# Patient Record
Sex: FEMALE | Race: WHITE | NOT HISPANIC OR LATINO | Employment: OTHER | URBAN - METROPOLITAN AREA
[De-identification: names, ages, dates, MRNs, and addresses within clinical notes are randomized per-mention and may not be internally consistent; named-entity substitution may affect disease eponyms.]

---

## 2024-01-30 RX ORDER — LANOLIN ALCOHOL/MO/W.PET/CERES
3 CREAM (GRAM) TOPICAL
COMMUNITY

## 2024-01-30 RX ORDER — ROSUVASTATIN CALCIUM 5 MG/1
5 TABLET, COATED ORAL
COMMUNITY
Start: 2024-01-10

## 2024-01-30 RX ORDER — MOMETASONE FUROATE 1 MG/G
CREAM TOPICAL DAILY PRN
COMMUNITY
Start: 2023-01-30

## 2024-01-30 RX ORDER — LYSINE HCL 500 MG
500 TABLET ORAL DAILY
COMMUNITY

## 2024-01-30 RX ORDER — KETOCONAZOLE 20 MG/G
1 CREAM TOPICAL DAILY PRN
COMMUNITY
Start: 2023-01-30

## 2024-01-30 RX ORDER — METOPROLOL TARTRATE 50 MG
1 TABLET ORAL 2 TIMES DAILY
COMMUNITY
Start: 2023-08-18

## 2024-01-30 RX ORDER — LANOLIN ALCOHOL/MO/W.PET/CERES
1000 CREAM (GRAM) TOPICAL DAILY
COMMUNITY

## 2024-01-30 RX ORDER — AMOXICILLIN 500 MG
1200 CAPSULE ORAL DAILY
COMMUNITY

## 2024-01-30 NOTE — PROVIDER NOTIFICATION
01/30/24 1459   Discharge Planning   Patient/Family Anticipates Transition to home with family   Living Arrangements   People in Home spouse   Type of Residence Private Residence   Is your private residence a single family home or apartment? Single family home   Number of Stairs, Within Home, Primary greater than 10 stairs  (stairs to get into house)   Stair Railings, Within Home, Primary railings safe and in good condition   Once home, are you able to live on one level? Yes   Which level? Main Level   Bathroom Shower/Tub Walk-in shower   Equipment Currently Used at Home none   Support System   Support Systems Spouse/Significant Other;Children   Do you have someone available to stay with you one or two nights once you are home? Yes   Medical Clearance   Date of Physical 02/05/24   Clinic Name [facility]   It is recommended that you call and check with any specialty providers before surgery to see if you need surgical clearance.  Do you see any specialty providers outside of your primary care provider? No   Blood   Known Bleeding Disorder or Coagulopathy No   Does the patient have any Worship/cultural preferences related to blood products? No   Education   Has the patient scheduled or completed pre-op total joint education, either in class or online, in the last 12 months? No   What day did the patient complete, or plan to complete, pre-op total joint education? 01/30/24  (plans to view pre recorded class)   Patient attended total joint pre-op class/received pre-op teaching  online   Relationship/Living Environment   Name(s) of People in Home [place]/spouse

## 2024-02-24 NOTE — PHARMACY-ADMISSION MEDICATION HISTORY
PTA meds completed by pre-admitting nurse Paola Valencia and reviewed by pharmacy       Prior to Admission medications    Medication Sig Last Dose Taking? Auth Provider Long Term End Date   cholecalciferol (VITAMIN D3) 125 mcg (5000 units) capsule Take 125 mcg by mouth daily  Yes Reported, Patient     cyanocobalamin (VITAMIN B-12) 1000 MCG tablet Take 1,000 mcg by mouth daily  Yes Reported, Patient     ketoconazole (NIZORAL) 2 % external cream Apply 1 Application topically daily as needed  Yes Reported, Patient     l-lysine HCl 500 MG TABS tablet Take 500 mg by mouth daily  Yes Reported, Patient     melatonin 3 MG tablet Take 3 mg by mouth nightly as needed for sleep  Yes Reported, Patient     metoprolol tartrate (LOPRESSOR) 50 MG tablet Take 1 tablet by mouth 2 times daily  Yes Reported, Patient Yes    mometasone (ELOCON) 0.1 % external cream Apply topically daily as needed  Yes Reported, Patient     Omega-3 Fatty Acids (FISH OIL) 1200 MG capsule Take 1,200 mg by mouth daily  Yes Reported, Patient     rosuvastatin (CRESTOR) 5 MG tablet Take 5 mg by mouth twice a week  Yes Reported, Patient Yes    Probiotic Product (MISC INTESTINAL DELGADO REGULAT) CAPS Take 1 capsule by mouth daily   Reported, Patient

## 2024-02-26 ENCOUNTER — APPOINTMENT (OUTPATIENT)
Dept: GENERAL RADIOLOGY | Facility: CLINIC | Age: 74
End: 2024-02-26
Attending: ORTHOPAEDIC SURGERY
Payer: COMMERCIAL

## 2024-02-26 ENCOUNTER — ANESTHESIA EVENT (OUTPATIENT)
Dept: SURGERY | Facility: CLINIC | Age: 74
End: 2024-02-26
Payer: COMMERCIAL

## 2024-02-26 ENCOUNTER — ANESTHESIA (OUTPATIENT)
Dept: SURGERY | Facility: CLINIC | Age: 74
End: 2024-02-26
Payer: COMMERCIAL

## 2024-02-26 ENCOUNTER — HOSPITAL ENCOUNTER (OUTPATIENT)
Facility: CLINIC | Age: 74
Discharge: HOME | End: 2024-02-27
Attending: ORTHOPAEDIC SURGERY | Admitting: ORTHOPAEDIC SURGERY
Payer: COMMERCIAL

## 2024-02-26 ENCOUNTER — APPOINTMENT (OUTPATIENT)
Dept: PHYSICAL THERAPY | Facility: CLINIC | Age: 74
End: 2024-02-26
Attending: ORTHOPAEDIC SURGERY
Payer: COMMERCIAL

## 2024-02-26 DIAGNOSIS — Z96.651 S/P TOTAL KNEE ARTHROPLASTY, RIGHT: Primary | ICD-10-CM

## 2024-02-26 LAB
CREAT SERPL-MCNC: 0.63 MG/DL (ref 0.51–0.95)
EGFRCR SERPLBLD CKD-EPI 2021: >90 ML/MIN/1.73M2
HOLD SPECIMEN: NORMAL
HOLD SPECIMEN: NORMAL
PLATELET # BLD AUTO: 255 10E3/UL (ref 150–450)

## 2024-02-26 PROCEDURE — 97161 PT EVAL LOW COMPLEX 20 MIN: CPT | Mod: GP

## 2024-02-26 PROCEDURE — 250N000013 HC RX MED GY IP 250 OP 250 PS 637: Performed by: PHYSICIAN ASSISTANT

## 2024-02-26 PROCEDURE — 99207 PR NO CHARGE LOS: CPT | Performed by: PHYSICIAN ASSISTANT

## 2024-02-26 PROCEDURE — 999N000065 XR KNEE PORT RIGHT 1/2 VIEWS: Mod: RT

## 2024-02-26 PROCEDURE — 258N000003 HC RX IP 258 OP 636: Performed by: NURSE ANESTHETIST, CERTIFIED REGISTERED

## 2024-02-26 PROCEDURE — 710N000009 HC RECOVERY PHASE 1, LEVEL 1, PER MIN: Performed by: ORTHOPAEDIC SURGERY

## 2024-02-26 PROCEDURE — 258N000003 HC RX IP 258 OP 636: Performed by: ANESTHESIOLOGY

## 2024-02-26 PROCEDURE — 97110 THERAPEUTIC EXERCISES: CPT | Mod: GP

## 2024-02-26 PROCEDURE — 999N000141 HC STATISTIC PRE-PROCEDURE NURSING ASSESSMENT: Performed by: ORTHOPAEDIC SURGERY

## 2024-02-26 PROCEDURE — 250N000011 HC RX IP 250 OP 636: Performed by: ANESTHESIOLOGY

## 2024-02-26 PROCEDURE — 250N000009 HC RX 250: Performed by: NURSE ANESTHETIST, CERTIFIED REGISTERED

## 2024-02-26 PROCEDURE — 82565 ASSAY OF CREATININE: CPT | Performed by: ORTHOPAEDIC SURGERY

## 2024-02-26 PROCEDURE — 250N000011 HC RX IP 250 OP 636: Performed by: NURSE ANESTHETIST, CERTIFIED REGISTERED

## 2024-02-26 PROCEDURE — 250N000011 HC RX IP 250 OP 636: Performed by: PHYSICIAN ASSISTANT

## 2024-02-26 PROCEDURE — 258N000003 HC RX IP 258 OP 636: Performed by: ORTHOPAEDIC SURGERY

## 2024-02-26 PROCEDURE — 36415 COLL VENOUS BLD VENIPUNCTURE: CPT | Performed by: ORTHOPAEDIC SURGERY

## 2024-02-26 PROCEDURE — 370N000017 HC ANESTHESIA TECHNICAL FEE, PER MIN: Performed by: ORTHOPAEDIC SURGERY

## 2024-02-26 PROCEDURE — 97116 GAIT TRAINING THERAPY: CPT | Mod: GP

## 2024-02-26 PROCEDURE — C1713 ANCHOR/SCREW BN/BN,TIS/BN: HCPCS | Performed by: ORTHOPAEDIC SURGERY

## 2024-02-26 PROCEDURE — 250N000009 HC RX 250: Performed by: ORTHOPAEDIC SURGERY

## 2024-02-26 PROCEDURE — 250N000013 HC RX MED GY IP 250 OP 250 PS 637: Performed by: ORTHOPAEDIC SURGERY

## 2024-02-26 PROCEDURE — 250N000011 HC RX IP 250 OP 636: Performed by: ORTHOPAEDIC SURGERY

## 2024-02-26 PROCEDURE — 258N000001 HC RX 258: Performed by: ORTHOPAEDIC SURGERY

## 2024-02-26 PROCEDURE — 360N000077 HC SURGERY LEVEL 4, PER MIN: Performed by: ORTHOPAEDIC SURGERY

## 2024-02-26 PROCEDURE — 272N000001 HC OR GENERAL SUPPLY STERILE: Performed by: ORTHOPAEDIC SURGERY

## 2024-02-26 PROCEDURE — C1776 JOINT DEVICE (IMPLANTABLE): HCPCS | Performed by: ORTHOPAEDIC SURGERY

## 2024-02-26 PROCEDURE — 85049 AUTOMATED PLATELET COUNT: CPT | Performed by: ORTHOPAEDIC SURGERY

## 2024-02-26 PROCEDURE — 250N000013 HC RX MED GY IP 250 OP 250 PS 637: Performed by: ANESTHESIOLOGY

## 2024-02-26 DEVICE — IBAL TKA FEM, PS CEMENTED, SZ 5, RIGHT
Type: IMPLANTABLE DEVICE | Site: KNEE | Status: FUNCTIONAL
Brand: ARTHREX®

## 2024-02-26 DEVICE — PATELLA IMPLANT DOME, 37 X 10MM, VIT-E
Type: IMPLANTABLE DEVICE | Site: KNEE | Status: FUNCTIONAL
Brand: ARTHREX®

## 2024-02-26 DEVICE — IBALANCE TKA, MODULAR TIBIAL TRAY, SZ 4
Type: IMPLANTABLE DEVICE | Site: KNEE | Status: FUNCTIONAL
Brand: ARTHREX®

## 2024-02-26 DEVICE — BONE CEMENT STRK SIMPLEX P SPEEDSET 6192-1-001: Type: IMPLANTABLE DEVICE | Site: KNEE | Status: FUNCTIONAL

## 2024-02-26 DEVICE — PS TIBIAL BEARING, SIZE 4, 8MM, VIT E
Type: IMPLANTABLE DEVICE | Site: KNEE | Status: FUNCTIONAL
Brand: ARTHREX®

## 2024-02-26 RX ORDER — CELECOXIB 200 MG/1
400 CAPSULE ORAL ONCE
Status: COMPLETED | OUTPATIENT
Start: 2024-02-26 | End: 2024-02-26

## 2024-02-26 RX ORDER — FENTANYL CITRATE 50 UG/ML
INJECTION, SOLUTION INTRAMUSCULAR; INTRAVENOUS PRN
Status: DISCONTINUED | OUTPATIENT
Start: 2024-02-26 | End: 2024-02-26

## 2024-02-26 RX ORDER — ROSUVASTATIN CALCIUM 5 MG/1
5 TABLET, COATED ORAL
Status: DISCONTINUED | OUTPATIENT
Start: 2024-02-26 | End: 2024-02-27 | Stop reason: HOSPADM

## 2024-02-26 RX ORDER — BISACODYL 10 MG
10 SUPPOSITORY, RECTAL RECTAL DAILY PRN
Status: DISCONTINUED | OUTPATIENT
Start: 2024-02-26 | End: 2024-02-27 | Stop reason: HOSPADM

## 2024-02-26 RX ORDER — DEXAMETHASONE SODIUM PHOSPHATE 4 MG/ML
4 INJECTION, SOLUTION INTRA-ARTICULAR; INTRALESIONAL; INTRAMUSCULAR; INTRAVENOUS; SOFT TISSUE
Status: DISCONTINUED | OUTPATIENT
Start: 2024-02-26 | End: 2024-02-26 | Stop reason: HOSPADM

## 2024-02-26 RX ORDER — PROCHLORPERAZINE MALEATE 5 MG
5 TABLET ORAL EVERY 6 HOURS PRN
Status: DISCONTINUED | OUTPATIENT
Start: 2024-02-26 | End: 2024-02-27 | Stop reason: HOSPADM

## 2024-02-26 RX ORDER — ONDANSETRON 2 MG/ML
4 INJECTION INTRAMUSCULAR; INTRAVENOUS EVERY 30 MIN PRN
Status: DISCONTINUED | OUTPATIENT
Start: 2024-02-26 | End: 2024-02-26 | Stop reason: HOSPADM

## 2024-02-26 RX ORDER — HYDROMORPHONE HCL IN WATER/PF 6 MG/30 ML
0.2 PATIENT CONTROLLED ANALGESIA SYRINGE INTRAVENOUS
Status: DISCONTINUED | OUTPATIENT
Start: 2024-02-26 | End: 2024-02-27 | Stop reason: HOSPADM

## 2024-02-26 RX ORDER — OXYCODONE HYDROCHLORIDE 5 MG/1
5 TABLET ORAL EVERY 4 HOURS PRN
Status: DISCONTINUED | OUTPATIENT
Start: 2024-02-26 | End: 2024-02-26 | Stop reason: HOSPADM

## 2024-02-26 RX ORDER — ONDANSETRON 4 MG/1
4 TABLET, ORALLY DISINTEGRATING ORAL EVERY 30 MIN PRN
Status: DISCONTINUED | OUTPATIENT
Start: 2024-02-26 | End: 2024-02-26 | Stop reason: HOSPADM

## 2024-02-26 RX ORDER — ACETAMINOPHEN 325 MG/1
975 TABLET ORAL EVERY 8 HOURS
Qty: 27 TABLET | Refills: 0 | Status: DISCONTINUED | OUTPATIENT
Start: 2024-02-26 | End: 2024-02-27 | Stop reason: HOSPADM

## 2024-02-26 RX ORDER — OXYCODONE HYDROCHLORIDE 5 MG/1
10 TABLET ORAL EVERY 4 HOURS PRN
Status: DISCONTINUED | OUTPATIENT
Start: 2024-02-26 | End: 2024-02-27 | Stop reason: HOSPADM

## 2024-02-26 RX ORDER — LIDOCAINE HYDROCHLORIDE 20 MG/ML
INJECTION, SOLUTION INFILTRATION; PERINEURAL PRN
Status: DISCONTINUED | OUTPATIENT
Start: 2024-02-26 | End: 2024-02-26

## 2024-02-26 RX ORDER — FENTANYL CITRATE 50 UG/ML
50 INJECTION, SOLUTION INTRAMUSCULAR; INTRAVENOUS EVERY 5 MIN PRN
Status: DISCONTINUED | OUTPATIENT
Start: 2024-02-26 | End: 2024-02-26 | Stop reason: HOSPADM

## 2024-02-26 RX ORDER — OXYCODONE HYDROCHLORIDE 5 MG/1
10 TABLET ORAL EVERY 4 HOURS PRN
Status: DISCONTINUED | OUTPATIENT
Start: 2024-02-26 | End: 2024-02-26 | Stop reason: HOSPADM

## 2024-02-26 RX ORDER — CEFAZOLIN SODIUM/WATER 2 G/20 ML
2 SYRINGE (ML) INTRAVENOUS
Status: COMPLETED | OUTPATIENT
Start: 2024-02-26 | End: 2024-02-26

## 2024-02-26 RX ORDER — METOPROLOL TARTRATE 50 MG
50 TABLET ORAL 2 TIMES DAILY
Status: DISCONTINUED | OUTPATIENT
Start: 2024-02-26 | End: 2024-02-27 | Stop reason: HOSPADM

## 2024-02-26 RX ORDER — AMOXICILLIN 250 MG
1-2 CAPSULE ORAL 2 TIMES DAILY
Qty: 30 TABLET | Refills: 0 | Status: SHIPPED | OUTPATIENT
Start: 2024-02-26 | End: 2024-10-02

## 2024-02-26 RX ORDER — TRANEXAMIC ACID 650 MG/1
1950 TABLET ORAL ONCE
Status: COMPLETED | OUTPATIENT
Start: 2024-02-26 | End: 2024-02-26

## 2024-02-26 RX ORDER — HYDROXYZINE HYDROCHLORIDE 10 MG/1
10 TABLET, FILM COATED ORAL EVERY 6 HOURS PRN
Status: DISCONTINUED | OUTPATIENT
Start: 2024-02-26 | End: 2024-02-27 | Stop reason: HOSPADM

## 2024-02-26 RX ORDER — ONDANSETRON 2 MG/ML
4 INJECTION INTRAMUSCULAR; INTRAVENOUS EVERY 6 HOURS
Status: COMPLETED | OUTPATIENT
Start: 2024-02-26 | End: 2024-02-27

## 2024-02-26 RX ORDER — CEFAZOLIN SODIUM 2 G/100ML
2 INJECTION, SOLUTION INTRAVENOUS EVERY 8 HOURS
Qty: 40 ML | Refills: 0 | Status: COMPLETED | OUTPATIENT
Start: 2024-02-26 | End: 2024-02-26

## 2024-02-26 RX ORDER — ASPIRIN 325 MG
325 TABLET, DELAYED RELEASE (ENTERIC COATED) ORAL 2 TIMES DAILY
Qty: 70 TABLET | Refills: 0 | Status: SHIPPED | OUTPATIENT
Start: 2024-02-26 | End: 2024-10-02

## 2024-02-26 RX ORDER — BUPIVACAINE HYDROCHLORIDE 7.5 MG/ML
INJECTION, SOLUTION INTRASPINAL
Status: COMPLETED | OUTPATIENT
Start: 2024-02-26 | End: 2024-02-26

## 2024-02-26 RX ORDER — LABETALOL HYDROCHLORIDE 5 MG/ML
10 INJECTION, SOLUTION INTRAVENOUS EVERY 10 MIN PRN
Status: DISCONTINUED | OUTPATIENT
Start: 2024-02-26 | End: 2024-02-26 | Stop reason: HOSPADM

## 2024-02-26 RX ORDER — HYDROXYZINE HYDROCHLORIDE 25 MG/1
25 TABLET, FILM COATED ORAL 3 TIMES DAILY PRN
Status: DISCONTINUED | OUTPATIENT
Start: 2024-02-26 | End: 2024-02-27 | Stop reason: HOSPADM

## 2024-02-26 RX ORDER — SODIUM CHLORIDE, SODIUM LACTATE, POTASSIUM CHLORIDE, CALCIUM CHLORIDE 600; 310; 30; 20 MG/100ML; MG/100ML; MG/100ML; MG/100ML
INJECTION, SOLUTION INTRAVENOUS CONTINUOUS
Status: DISCONTINUED | OUTPATIENT
Start: 2024-02-26 | End: 2024-02-26 | Stop reason: HOSPADM

## 2024-02-26 RX ORDER — CELECOXIB 100 MG/1
100 CAPSULE ORAL 2 TIMES DAILY
Qty: 4 CAPSULE | Refills: 0 | Status: DISCONTINUED | OUTPATIENT
Start: 2024-02-26 | End: 2024-02-27 | Stop reason: HOSPADM

## 2024-02-26 RX ORDER — LIDOCAINE 40 MG/G
CREAM TOPICAL
Status: DISCONTINUED | OUTPATIENT
Start: 2024-02-26 | End: 2024-02-27 | Stop reason: HOSPADM

## 2024-02-26 RX ORDER — FENTANYL CITRATE 50 UG/ML
25 INJECTION, SOLUTION INTRAMUSCULAR; INTRAVENOUS EVERY 5 MIN PRN
Status: DISCONTINUED | OUTPATIENT
Start: 2024-02-26 | End: 2024-02-26 | Stop reason: HOSPADM

## 2024-02-26 RX ORDER — AMOXICILLIN 250 MG
1 CAPSULE ORAL 2 TIMES DAILY
Status: DISCONTINUED | OUTPATIENT
Start: 2024-02-26 | End: 2024-02-27 | Stop reason: HOSPADM

## 2024-02-26 RX ORDER — ALBUTEROL SULFATE 0.83 MG/ML
2.5 SOLUTION RESPIRATORY (INHALATION) EVERY 4 HOURS PRN
Status: DISCONTINUED | OUTPATIENT
Start: 2024-02-26 | End: 2024-02-26 | Stop reason: HOSPADM

## 2024-02-26 RX ORDER — DIAZEPAM 10 MG/2ML
2.5 INJECTION, SOLUTION INTRAMUSCULAR; INTRAVENOUS
Status: DISCONTINUED | OUTPATIENT
Start: 2024-02-26 | End: 2024-02-26 | Stop reason: HOSPADM

## 2024-02-26 RX ORDER — OXYCODONE HYDROCHLORIDE 5 MG/1
5-10 TABLET ORAL EVERY 4 HOURS PRN
Qty: 26 TABLET | Refills: 0 | Status: SHIPPED | OUTPATIENT
Start: 2024-02-26 | End: 2024-10-02

## 2024-02-26 RX ORDER — OXYCODONE HYDROCHLORIDE 5 MG/1
5 TABLET ORAL EVERY 4 HOURS PRN
Status: DISCONTINUED | OUTPATIENT
Start: 2024-02-26 | End: 2024-02-27 | Stop reason: HOSPADM

## 2024-02-26 RX ORDER — ENOXAPARIN SODIUM 100 MG/ML
40 INJECTION SUBCUTANEOUS EVERY 24 HOURS
Status: DISCONTINUED | OUTPATIENT
Start: 2024-02-27 | End: 2024-02-27 | Stop reason: HOSPADM

## 2024-02-26 RX ORDER — ACETAMINOPHEN 325 MG/1
975 TABLET ORAL ONCE
Status: COMPLETED | OUTPATIENT
Start: 2024-02-26 | End: 2024-02-26

## 2024-02-26 RX ORDER — SODIUM CHLORIDE, SODIUM LACTATE, POTASSIUM CHLORIDE, CALCIUM CHLORIDE 600; 310; 30; 20 MG/100ML; MG/100ML; MG/100ML; MG/100ML
INJECTION, SOLUTION INTRAVENOUS CONTINUOUS
Status: DISCONTINUED | OUTPATIENT
Start: 2024-02-26 | End: 2024-02-27 | Stop reason: HOSPADM

## 2024-02-26 RX ORDER — ONDANSETRON 4 MG/1
4 TABLET, ORALLY DISINTEGRATING ORAL EVERY 6 HOURS PRN
Status: DISCONTINUED | OUTPATIENT
Start: 2024-02-26 | End: 2024-02-27 | Stop reason: HOSPADM

## 2024-02-26 RX ORDER — HYDROXYZINE HYDROCHLORIDE 10 MG/1
10 TABLET, FILM COATED ORAL EVERY 6 HOURS PRN
Qty: 30 TABLET | Refills: 0 | Status: SHIPPED | OUTPATIENT
Start: 2024-02-26 | End: 2024-10-02

## 2024-02-26 RX ORDER — ACETAMINOPHEN 325 MG/1
650 TABLET ORAL EVERY 4 HOURS PRN
Status: DISCONTINUED | OUTPATIENT
Start: 2024-02-29 | End: 2024-02-27 | Stop reason: HOSPADM

## 2024-02-26 RX ORDER — LIDOCAINE 40 MG/G
CREAM TOPICAL
Status: DISCONTINUED | OUTPATIENT
Start: 2024-02-26 | End: 2024-02-26 | Stop reason: HOSPADM

## 2024-02-26 RX ORDER — PROPOFOL 10 MG/ML
INJECTION, EMULSION INTRAVENOUS CONTINUOUS PRN
Status: DISCONTINUED | OUTPATIENT
Start: 2024-02-26 | End: 2024-02-26

## 2024-02-26 RX ORDER — FENTANYL CITRATE 50 UG/ML
25 INJECTION, SOLUTION INTRAMUSCULAR; INTRAVENOUS
Status: DISCONTINUED | OUTPATIENT
Start: 2024-02-26 | End: 2024-02-26 | Stop reason: HOSPADM

## 2024-02-26 RX ORDER — CEFAZOLIN SODIUM/WATER 2 G/20 ML
2 SYRINGE (ML) INTRAVENOUS SEE ADMIN INSTRUCTIONS
Status: DISCONTINUED | OUTPATIENT
Start: 2024-02-26 | End: 2024-02-26 | Stop reason: HOSPADM

## 2024-02-26 RX ORDER — FAMOTIDINE 20 MG/1
20 TABLET, FILM COATED ORAL 2 TIMES DAILY
Status: DISCONTINUED | OUTPATIENT
Start: 2024-02-26 | End: 2024-02-27 | Stop reason: HOSPADM

## 2024-02-26 RX ORDER — HYDROMORPHONE HCL IN WATER/PF 6 MG/30 ML
0.4 PATIENT CONTROLLED ANALGESIA SYRINGE INTRAVENOUS EVERY 5 MIN PRN
Status: DISCONTINUED | OUTPATIENT
Start: 2024-02-26 | End: 2024-02-26 | Stop reason: HOSPADM

## 2024-02-26 RX ORDER — ONDANSETRON 2 MG/ML
4 INJECTION INTRAMUSCULAR; INTRAVENOUS EVERY 6 HOURS PRN
Status: DISCONTINUED | OUTPATIENT
Start: 2024-02-26 | End: 2024-02-27 | Stop reason: HOSPADM

## 2024-02-26 RX ORDER — SODIUM CHLORIDE, SODIUM LACTATE, POTASSIUM CHLORIDE, CALCIUM CHLORIDE 600; 310; 30; 20 MG/100ML; MG/100ML; MG/100ML; MG/100ML
INJECTION, SOLUTION INTRAVENOUS CONTINUOUS PRN
Status: DISCONTINUED | OUTPATIENT
Start: 2024-02-26 | End: 2024-02-26

## 2024-02-26 RX ORDER — ACETAMINOPHEN 325 MG/1
650 TABLET ORAL EVERY 4 HOURS PRN
Qty: 100 TABLET | Refills: 0 | Status: SHIPPED | OUTPATIENT
Start: 2024-02-26

## 2024-02-26 RX ORDER — MEPERIDINE HYDROCHLORIDE 25 MG/ML
12.5 INJECTION INTRAMUSCULAR; INTRAVENOUS; SUBCUTANEOUS EVERY 5 MIN PRN
Status: DISCONTINUED | OUTPATIENT
Start: 2024-02-26 | End: 2024-02-26 | Stop reason: HOSPADM

## 2024-02-26 RX ORDER — POLYETHYLENE GLYCOL 3350 17 G/17G
17 POWDER, FOR SOLUTION ORAL DAILY
Status: DISCONTINUED | OUTPATIENT
Start: 2024-02-27 | End: 2024-02-27 | Stop reason: HOSPADM

## 2024-02-26 RX ORDER — HYDROMORPHONE HCL IN WATER/PF 6 MG/30 ML
0.2 PATIENT CONTROLLED ANALGESIA SYRINGE INTRAVENOUS EVERY 5 MIN PRN
Status: DISCONTINUED | OUTPATIENT
Start: 2024-02-26 | End: 2024-02-26 | Stop reason: HOSPADM

## 2024-02-26 RX ORDER — HYDROMORPHONE HCL IN WATER/PF 6 MG/30 ML
0.4 PATIENT CONTROLLED ANALGESIA SYRINGE INTRAVENOUS
Status: DISCONTINUED | OUTPATIENT
Start: 2024-02-26 | End: 2024-02-27 | Stop reason: HOSPADM

## 2024-02-26 RX ORDER — VANCOMYCIN HYDROCHLORIDE 1 G/20ML
INJECTION, POWDER, LYOPHILIZED, FOR SOLUTION INTRAVENOUS PRN
Status: DISCONTINUED | OUTPATIENT
Start: 2024-02-26 | End: 2024-02-26 | Stop reason: HOSPADM

## 2024-02-26 RX ORDER — NALOXONE HYDROCHLORIDE 0.4 MG/ML
0.1 INJECTION, SOLUTION INTRAMUSCULAR; INTRAVENOUS; SUBCUTANEOUS
Status: DISCONTINUED | OUTPATIENT
Start: 2024-02-26 | End: 2024-02-27 | Stop reason: HOSPADM

## 2024-02-26 RX ORDER — NALOXONE HYDROCHLORIDE 0.4 MG/ML
0.1 INJECTION, SOLUTION INTRAMUSCULAR; INTRAVENOUS; SUBCUTANEOUS
Status: DISCONTINUED | OUTPATIENT
Start: 2024-02-26 | End: 2024-02-26 | Stop reason: HOSPADM

## 2024-02-26 RX ORDER — BUPIVACAINE HYDROCHLORIDE 5 MG/ML
INJECTION, SOLUTION EPIDURAL; INTRACAUDAL
Status: COMPLETED | OUTPATIENT
Start: 2024-02-26 | End: 2024-02-26

## 2024-02-26 RX ADMIN — ACETAMINOPHEN 975 MG: 325 TABLET, FILM COATED ORAL at 20:57

## 2024-02-26 RX ADMIN — ROSUVASTATIN CALCIUM 5 MG: 5 TABLET, FILM COATED ORAL at 20:58

## 2024-02-26 RX ADMIN — HYDROXYZINE HYDROCHLORIDE 25 MG: 25 TABLET, FILM COATED ORAL at 14:45

## 2024-02-26 RX ADMIN — CELECOXIB 100 MG: 100 CAPSULE ORAL at 17:02

## 2024-02-26 RX ADMIN — SENNOSIDES AND DOCUSATE SODIUM 1 TABLET: 50; 8.6 TABLET ORAL at 19:41

## 2024-02-26 RX ADMIN — BUPIVACAINE HYDROCHLORIDE 30 ML: 5 INJECTION, SOLUTION EPIDURAL; INTRACAUDAL at 07:11

## 2024-02-26 RX ADMIN — BUPIVACAINE HYDROCHLORIDE IN DEXTROSE 1.6 ML: 7.5 INJECTION, SOLUTION SUBARACHNOID at 07:29

## 2024-02-26 RX ADMIN — OXYCODONE HYDROCHLORIDE 5 MG: 5 TABLET ORAL at 19:41

## 2024-02-26 RX ADMIN — SODIUM CHLORIDE, POTASSIUM CHLORIDE, SODIUM LACTATE AND CALCIUM CHLORIDE: 600; 310; 30; 20 INJECTION, SOLUTION INTRAVENOUS at 06:25

## 2024-02-26 RX ADMIN — PROPOFOL 75 MCG/KG/MIN: 10 INJECTION, EMULSION INTRAVENOUS at 07:36

## 2024-02-26 RX ADMIN — CEFAZOLIN SODIUM 2 G: 2 INJECTION, SOLUTION INTRAVENOUS at 22:10

## 2024-02-26 RX ADMIN — ACETAMINOPHEN 975 MG: 325 TABLET, FILM COATED ORAL at 12:36

## 2024-02-26 RX ADMIN — LIDOCAINE HYDROCHLORIDE 50 MG: 20 INJECTION, SOLUTION INFILTRATION; PERINEURAL at 07:36

## 2024-02-26 RX ADMIN — TRANEXAMIC ACID 1950 MG: 650 TABLET ORAL at 05:54

## 2024-02-26 RX ADMIN — SODIUM CHLORIDE, POTASSIUM CHLORIDE, SODIUM LACTATE AND CALCIUM CHLORIDE: 600; 310; 30; 20 INJECTION, SOLUTION INTRAVENOUS at 07:19

## 2024-02-26 RX ADMIN — ACETAMINOPHEN 975 MG: 325 TABLET, FILM COATED ORAL at 06:52

## 2024-02-26 RX ADMIN — Medication 2 G: at 07:19

## 2024-02-26 RX ADMIN — PHENYLEPHRINE HYDROCHLORIDE 100 MCG: 10 INJECTION INTRAVENOUS at 08:45

## 2024-02-26 RX ADMIN — FAMOTIDINE 20 MG: 20 TABLET ORAL at 10:56

## 2024-02-26 RX ADMIN — SODIUM CHLORIDE 1000 ML: 9 INJECTION, SOLUTION INTRAVENOUS at 11:01

## 2024-02-26 RX ADMIN — METOPROLOL TARTRATE 50 MG: 50 TABLET, FILM COATED ORAL at 20:57

## 2024-02-26 RX ADMIN — CELECOXIB 400 MG: 200 CAPSULE ORAL at 05:55

## 2024-02-26 RX ADMIN — PHENYLEPHRINE HYDROCHLORIDE 100 MCG: 10 INJECTION INTRAVENOUS at 08:12

## 2024-02-26 RX ADMIN — SENNOSIDES AND DOCUSATE SODIUM 1 TABLET: 50; 8.6 TABLET ORAL at 10:56

## 2024-02-26 RX ADMIN — PHENYLEPHRINE HYDROCHLORIDE 100 MCG: 10 INJECTION INTRAVENOUS at 07:54

## 2024-02-26 RX ADMIN — FENTANYL CITRATE 50 MCG: 50 INJECTION INTRAMUSCULAR; INTRAVENOUS at 07:22

## 2024-02-26 RX ADMIN — SODIUM CHLORIDE, POTASSIUM CHLORIDE, SODIUM LACTATE AND CALCIUM CHLORIDE: 600; 310; 30; 20 INJECTION, SOLUTION INTRAVENOUS at 17:02

## 2024-02-26 RX ADMIN — FAMOTIDINE 20 MG: 20 TABLET ORAL at 19:41

## 2024-02-26 RX ADMIN — CEFAZOLIN SODIUM 2 G: 2 INJECTION, SOLUTION INTRAVENOUS at 14:44

## 2024-02-26 RX ADMIN — OXYCODONE HYDROCHLORIDE 5 MG: 5 TABLET ORAL at 14:45

## 2024-02-26 RX ADMIN — HYDROMORPHONE HYDROCHLORIDE 0.2 MG: 0.2 INJECTION, SOLUTION INTRAMUSCULAR; INTRAVENOUS; SUBCUTANEOUS at 11:16

## 2024-02-26 NOTE — ANESTHESIA PROCEDURE NOTES
"Intrathecal injection Procedure Note    Pre-Procedure   Staff -        Anesthesiologist:  Juan Manuel Beal MD       Performed By: anesthesiologist       Location: OR       Pre-Anesthestic Checklist: patient identified, IV checked, risks and benefits discussed, informed consent, monitors and equipment checked and pre-op evaluation  Timeout:       Correct Patient: Yes        Correct Procedure: Yes        Correct Site: Yes        Correct Position: Yes   Procedure Documentation  Procedure: intrathecal injection       Patient Position: sitting       Patient Prep/Sterile Barriers: sterile gloves, mask       Skin prep: Betadine       Insertion Site: L3-4. (midline approach).       Needle Gauge: 22.        Needle Length (Inches): 3.5        Spinal Needle Type: Lonnie tipNo introducer used       # of attempts: 1 and  # of redirects:  2    Assessment/Narrative         Paresthesias: No.       CSF fluid: clear.       Opening pressure was cmH2O while  Sitting.      Medication(s) Administered   0.75% Hyperbaric Bupivacaine (Intrathecal) - Intrathecal   1.6 mL - 2/26/2024 7:29:00 AM   Comments:  Katie, lot [id number], exp.2026-02-28      FOR [facility] ([facility]) ONLY:   Pain Team Contact information: please page the Pain Team Via [employer]. Search \"Pain\". During daytime hours, please page the attending first. At night please page the resident first.      "

## 2024-02-26 NOTE — ANESTHESIA PROCEDURE NOTES
"Saphenous Procedure Note    Pre-Procedure   Staff -        Anesthesiologist:  Juan Manuel Beal MD       Performed By: anesthesiologist       Location: pre-op       Procedure Start/Stop Times: 2/26/2024 7:01 AM and 2/26/2024 7:11 AM       Pre-Anesthestic Checklist: patient identified, IV checked, site marked, risks and benefits discussed, informed consent, monitors and equipment checked, pre-op evaluation, at physician/surgeon's request and post-op pain management  Timeout:       Correct Patient: Yes        Correct Procedure: Yes        Correct Site: Yes        Correct Position: Yes        Correct Laterality: Yes        Site Marked: Yes  Procedure Documentation  Procedure: Saphenous       Laterality: right       Patient Position: supine       Patient Prep/Sterile Barriers: sterile gloves       Skin prep: Betadine       Local skin infiltrated with 0.6 mL of 1% lidocaine.        Needle Type: insulated       Needle Gauge: 21.        Needle Length (Inches): 3.13        Ultrasound guided       1. Ultrasound was used to identify targeted nerve, plexus, vascular marker, or fascial plane and place a needle adjacent to it in real-time.       2. Ultrasound was used to visualize the spread of anesthetic in close proximity to the above referenced structure.    Assessment/Narrative         The placement was positive for bleeding at site (minimal oozing for less than a minute).       The placement was negative for: blood aspirated and painful injection       Paresthesias: No.       Complications: none    Medication(s) Administered   Bupivacaine 0.5% PF (Infiltration) - Infiltration   30 mL - 2/26/2024 7:11:00 AM  Medication Administration Time: 2/26/2024 7:01 AM      FOR [facility] ([facility]) ONLY:   Pain Team Contact information: please page the Pain Team Via [employer]. Search \"Pain\". During daytime hours, please page the attending first. At night please page the resident first.      "

## 2024-02-26 NOTE — OP NOTE
Conor Kapadia  February 26, 2024  [facility]  PREOPERATIVE DIAGNOSIS: Severe degenerative arthritis refractory to conservative treatment, right knee.  POSTOPERATIVE DIAGNOSIS: Severe degenerative arthritis refractory to conservative treatment, right knee.  PROCEDURE: right total knee arthroplasty using Arthrex ibalance total knee with a size 5  posterior stabilized femoral component, a 4 universal tibial baseplate, a 8 mm PS tibial insert and a 27 symmetric round patellar button.  SURGEON: Moe Arroyo MD.  FIRST ASSISTANT: Nancy Jessica PA-C, who provided retraction, positioning and was crucial throughout the entire case.  ANESTHESIA: Femoral nerve block followed by spinal anesthetic.  ESTIMATED BLOOD LOSS: 5 cc.  Implants:   Implant Name Type Inv. Item Serial No.  Lot No. LRB No. Used Action   PATELLA IMP DOME 37 X 10MM VIT-E - [id number] Total Joint Component/Insert PATELLA IMP DOME 37 X 10MM VIT-E  [employer] [id number] Right 1 Implanted   COMP FEMORAL KNEE CEMENTED RIGHT SZ 5 STERILE AR-526-5R - [id number] Total Joint Component/Insert COMP FEMORAL KNEE CEMENTED RIGHT SZ 5 STERILE AR-526-5R  [employer] [id number] Right 1 Implanted   IMP COMP TKA IBALANCE MOD TIBIAL TRAY SZ 4 AR-513-T4 - [id number] Total Joint Component/Insert IMP COMP TKA IBALANCE MOD TIBIAL TRAY SZ 4 AR-513-T4  [employer]X [id number] Right 1 Implanted   BONE CEMENT STRK SIMPLEX P SPEEDSET 6192-1-001 - [id number] Cement, Bone BONE CEMENT STRK SIMPLEX P SPEEDSET 6192-1-001  [employer] [id number] Right 1 Implanted   PS TIBIAL BEARING SZ 4 8MM VIT E - [id number] Total Joint Component/Insert PS TIBIAL BEARING SZ 4 8MM VIT E  [employer] [id number] Right 1 Implanted     PREOPERATIVE ANTIBIOTIC PROPHYLAXIS: 2 gram IV ancef  With 1.95 gram of ORAL TXA PRIOR TO THE start.  POSTOPERATIVE DVT PROPHYLAXIS:  mg po BID.  INDICATION: Conor Kapadia has severe degenerative arthritis in knee. They have failed a course of conservative therapy,  including cortisone, activity modification, physical therapy and pain medicine. Having discussed continued nonoperative versus operative treatment, the patient wished to proceed with surgery to include a total knee replacement as arthritis has affected all 3 compartments in the knee. The surgery, potential risks, benefits and complications as well as expected postoperative course and recovery were all discussed in detail. I reviewed the option of continued nonoperative treatment. All questions were answered, and informed consent was obtained.  OPERATIVE REPORT: The patient was taken to the preoperative area, where an adductor canal block was placed in the right lower extremity by the anesthesiologist. They were then taken to the operating room, and a general anesthetic was obtained. Intravenous antibiotic prophylaxis was then given as listed above 30 minutes prior to inflating the tourniquet. The right knee was confirmed as the operative knee. It was prepped and draped in the usual fashion. Timeout was performed, confirming the right knee as the operative knee, and then it was elevated and exsanguinated. Tourniquet was itifiated to 300 mmHg.  An approximately 5-inch long vertical incision was made over the anterior aspect of the knee centered over the patella. Full-thickness skin flaps were created. A medial parapatellar arthrotomy was performed. I performed a mild medial release on the upper aspect of the tibia. The patella was everted, and the patellar fat pad was excised.  I everted the patella laterally, flexed the knee up, removed the osteophytes in the distal femur, placed the intramedullary cutting guide and resected 10 mm off the distal femur in 5 degrees.  I then sized the distal femur.  The femur sized to a 5 Arthrex ibalance femoral component.  With the cufting block in place, the anterior, posterior and chamfer cuts were performed.   I then paid my attention to the tibia.  I resected the tibia  perpendicular to the long axis of the tibia using and extramedullary guide.  Once this was done, I then removed the ACL, PCL, menisci and any posterior osteophytes off the condyles. I checked my flexion and extension gaps with the spacer block and the knee was well balanced. I then made my box cut for a posterior stablized femoral component and placed my trial implants into position.  With a size 4 tibia, a size 5 femur and a 8 mm thick PS polyethylene insert, this gave of good stability, full range of motion and the patella tracked nicely.  I then turned my attention to the patella, which measured 21 mm prior to resection.  I resected this to 13 mm and place a 37 mm in diameter 10mm thick round tripeg patella button in position.  With patella a button in position, this measured 23 mm and tracked nicely.    At this point, I marked my tibial rotation and punched for the tibial keel.  I then irrigated the knee with pulsed saline and prepared to cement in our final implants.  Using one batch of simplex speedset cement we cemented in a size 5 femur, a size 4 tibia, and 37 mm round tripeg patella button.  Once the cement had cured,  any excess cement was removed. I then trialed our polyethylene inserts and found that the 8 mm PS polyethylene insert gave us the best stability and full range of motion.   The real 8 mm PS insert was then impacted.  The knee tracked perfectly and was well balanced.  I PLACED ONE GRAM OF VANCOMYCIN POWDER IN THE WOUND.  The arthrotomy was then closed using interrupted 0 Vicryl plus sutures, 1 Stratafix plus suture and the skin was closed with absorbable sutures and staples in the skin.  An aquacel dressing was placed over the wound. The knee was placed in a soft compressive dressing. They were awoken and transferred to the Recovery Room in stable condition.  The postoperative plan will be to admit the patient to the hospital overnight. They can weightbear as tolerated. There were no noted  complications. Sponge and needle counts were correct.

## 2024-02-26 NOTE — PROGRESS NOTES
02/26/24 1642   Appointment Info   Signing Clinician's Name / Credentials (PT) Raine Mercado DPT   Rehab Comments (PT) WBAT RLE   Quick Adds   Quick Adds Certification   Living Environment   People in Home spouse   Current Living Arrangements house   Home Accessibility stairs to enter home   Number of Stairs, Main Entrance 4   Stair Railings, Main Entrance railings safe and in good condition;railings on both sides of stairs   Transportation Anticipated family or friend will provide   Living Environment Comments Pt lives in house with , 4STE with bilateral rails, all needs met on main level. Laundry in basement. Son works from home and is planning to stay with pt this week to assist.   Self-Care   Usual Activity Tolerance good   Current Activity Tolerance fair   Equipment Currently Used at Home none  (has FWW from previous surgery)   Fall history within last six months no   Activity/Exercise/Self-Care Comment IND at baseline for mobility and cares   General Information   Onset of Illness/Injury or Date of Surgery 02/26/24   Referring Physician Dex Arroyo MD   Patient/Family Therapy Goals Statement (PT) return home   Pertinent History of Current Problem (include personal factors and/or comorbidities that impact the POC) POD#0 s/p R TKA   Existing Precautions/Restrictions fall   Weight-Bearing Status - RLE weight-bearing as tolerated   Cognition   Affect/Mental Status (Cognition) WNL   Orientation Status (Cognition) oriented x 4   Follows Commands (Cognition) WNL   Pain Assessment   Patient Currently in Pain Yes, see Vital Sign flowsheet   Integumentary/Edema   Integumentary/Edema Comments incision covered   Posture    Posture Forward head position;Protracted shoulders   Range of Motion (ROM)   Range of Motion ROM deficits secondary to surgical procedure;ROM deficits secondary to pain   Strength (Manual Muscle Testing)   Strength (Manual Muscle Testing) Deficits observed during functional  mobility   Bed Mobility   Comment, (Bed Mobility) Sukhwinder supine>sit   Transfers   Comment, (Transfers) Sukhwinder sit<>stand with FWW   Gait/Stairs (Locomotion)   Distance in Feet (Gait) 10' for eval   Comment, (Gait/Stairs) SBA with FWW ambulation   Balance   Balance Comments good sitting balance, fair standing balance with FWW for support   Sensory Examination   Sensory Perception patient reports no sensory changes   Clinical Impression   Criteria for Skilled Therapeutic Intervention Yes, treatment indicated   PT Diagnosis (PT) impaired mobility s/p R TKA   Influenced by the following impairments decreased activity tolerance, pain, weakness, decreased ROM, impaired balance   Functional limitations due to impairments impaired bed mobility, transfers, ambulation, stairs   Clinical Presentation (PT Evaluation Complexity) stable   Clinical Presentation Rationale clinical judgement, chart review   Clinical Decision Making (Complexity) low complexity   Planned Therapy Interventions (PT) balance training;bed mobility training;gait training;home exercise program;neuromuscular re-education;patient/family education;ROM (range of motion);stair training;strengthening;transfer training;home program guidelines;risk factor education;progressive activity/exercise   Risk & Benefits of therapy have been explained evaluation/treatment results reviewed;care plan/treatment goals reviewed;risks/benefits reviewed;current/potential barriers reviewed;participants voiced agreement with care plan;participants included;patient   PT Total Evaluation Time   PT Eval, Low Complexity Minutes (75812) 6   Therapy Certification   Start of care date 02/26/24   Certification date from 02/26/24   Certification date to 02/29/24   Medical Diagnosis s/p total knee arthroplasty, right   Physical Therapy Goals   PT Frequency Daily   PT Predicted Duration/Target Date for Goal Attainment 02/27/24   PT Goals Bed Mobility;Transfers;Gait;Stairs   PT: Bed Mobility  Supervision/stand-by assist;Supine to/from sit   PT: Transfers Supervision/stand-by assist;Sit to/from stand;Assistive device   PT: Gait Supervision/stand-by assist;Rolling walker;150 feet   PT: Stairs Minimal assist;4 stairs;Rail on both sides   Interventions   Interventions Quick Adds Gait Training;Therapeutic Activity;Therapeutic Procedure   Therapeutic Procedure/Exercise   Ther. Procedure: strength, endurance, ROM, flexibillity Minutes (37979) 9   Symptoms Noted During/After Treatment fatigue;increased pain   Treatment Detail/Skilled Intervention Pt cued to complete supine exercises for strength and circulation benefits, including APs, heel slides, quad sets, and SLRs, x10 each. Cueing for slow and controlled movement, edu on benefits of exercises, intensity and frequency of exercises. Provided HEP handout.   Therapeutic Activity   Therapeutic Activities: dynamic activities to improve functional performance Minutes (68398) 6   Symptoms Noted During/After Treatment Fatigue;Increased pain   Treatment Detail/Skilled Intervention Pt supine upon arrival, agreeable. Edu on WBAT status, contracture prevention. After eval, pt completed toilet transfer with Sukhwinder and FWW, able to complete pericares IND. Returned to supine end of session, able to complete SBA, cueing to boost self higher in bed for improved positioning. Left with alarm on and needs in reach. Nurse updated.   Gait Training   Gait Training Minutes (40034) 9   Symptoms Noted During/After Treatment (Gait Training) fatigue;increased pain   Treatment Detail/Skilled Intervention After eval, pt ambulated another 150' with SBA and FWW. Demos step-through gait pattern, slow gait speed, increased tension in shoulders. Cueing to relax UT muscles, limit UE support on walker as able.   PT Discharge Planning   PT Plan trial stairs, progress bed mobility and transfers, review HEP   PT Discharge Recommendation (DC Rec)   (defer to ortho)   PT Rationale for DC Rec Pt below  baseline level of mobility, currently Ax1 with FWW. Anticipate with IP PT that pt will progress to return home with assist from son. Pt reports beginning OP PT this week.   PT Brief overview of current status Ax1 FWW   PT Equipment Needed at Discharge   (pt owns FWW)   Total Session Time   Timed Code Treatment Minutes 24   Total Session Time (sum of timed and untimed services) 30       [facility]  OUTPATIENT PHYSICAL THERAPY EVALUATION  PLAN OF TREATMENT FOR OUTPATIENT REHABILITATION  (COMPLETE FOR INITIAL CLAIMS ONLY)  Patient's Last Name, First Name, M.I.  YOB: 1950  Conor Kapadia                        Provider's Name  [facility] Medical Record No.  [id number]                             Onset Date:  02/26/24   Start of Care Date:  02/26/24   Type:     _X_PT   ___OT   ___SLP Medical Diagnosis:  s/p total knee arthroplasty, right              PT Diagnosis:  impaired mobility s/p R TKA Visits from SOC:  1     See note for plan of treatment, functional goals and certification details    I CERTIFY THE NEED FOR THESE SERVICES FURNISHED UNDER        THIS PLAN OF TREATMENT AND WHILE UNDER MY CARE     (Physician co-signature of this document indicates review and certification of the therapy plan).

## 2024-02-26 NOTE — ANESTHESIA POSTPROCEDURE EVALUATION
Patient: Conor Kapadia    Procedure: Procedure(s):  Right total knee arthroplasty       Anesthesia Type:  No value filed.    Note:     Postop Pain Control: Uneventful            Sign Out: Well controlled pain   PONV: No   Neuro/Psych: Uneventful            Sign Out: Acceptable/Baseline neuro status   Airway/Respiratory: Uneventful            Sign Out: Acceptable/Baseline resp. status   CV/Hemodynamics: Uneventful            Sign Out: Acceptable CV status   Other NRE: NONE   DID A NON-ROUTINE EVENT OCCUR? No           Last vitals:  Vitals Value Taken Time   /69 02/26/24 0930   Temp 97.6  F (36.4  C) 02/26/24 0911   Pulse 74 02/26/24 0935   Resp 7 02/26/24 0935   SpO2 97 % 02/26/24 0935   Vitals shown include unfiled device data.    Electronically Signed By: Juan Manuel Beal MD  February 26, 2024  9:36 AM

## 2024-02-26 NOTE — CONSULTS
Conor Kapadia is a 73 y/o female with PMH significant for obesity, low back pain, herpes simplex, fibromyalgia, HTN, PVCs, anxiety, HLD, insomnia, and GERD who was admitted on 2/26/2024 for severe degenerative OA of right knee s/p right TKA. Full chart review completed and no complications noted in operative report. VSS. Will reorder patient's essential PTA medications including Metoprolol for HTN and Rosuvastatin for HLD. Contacted patient's RN with no current concerns. Will hold off seeing patient at this time and my colleague in AM can review chart and if no concerns overnight patient likely would not need to be seen. Please contact if any concerns arise and patient can be seen at that time.    Shanthi Grijalva PA-C  [facility] Medicine

## 2024-02-26 NOTE — PLAN OF CARE
Goal Outcome Evaluation:       Pt arrived floor, alert and orientated x4. Pleasant. VSS on RA, 98 O2 sats without capno on. Up with assist of 1 gb/walker to bathroom. RLE is WBAT. C/o a burning pain/sensation in right knee. PIV running LR at 100 ml/hr. Reg diet.  Pt RLE is wrapped in ACE bandage, CDI. CMS intact. Ice applied to right knee

## 2024-02-26 NOTE — ANESTHESIA CARE TRANSFER NOTE
Patient: Conor Kapadia    Procedure: Procedure(s):  Right total knee arthroplasty       Diagnosis: Osteoarthritis of right knee [M17.11]  Diagnosis Additional Information: No value filed.    Anesthesia Type:   No value filed.     Note:    Oropharynx: oropharynx clear of all foreign objects and spontaneously breathing  Level of Consciousness: awake  Oxygen Supplementation: room air    Independent Airway: airway patency satisfactory and stable  Dentition: dentition unchanged  Vital Signs Stable: post-procedure vital signs reviewed and stable  Report to RN Given: handoff report given  Patient transferred to: PACU  Comments: VSS.  Report to RN.  Handoff Report: Identifed the Patient, Identified the Reponsible Provider, Reviewed the pertinent medical history, Discussed the surgical course, Reviewed Intra-OP anesthesia mangement and issues during anesthesia, Set expectations for post-procedure period and Allowed opportunity for questions and acknowledgement of understanding      Vitals:  Vitals Value Taken Time   /52 02/26/24 0911   Temp 97.6  F (36.4  C) 02/26/24 0911   Pulse 77 02/26/24 0913   Resp 7 02/26/24 0913   SpO2 97 % 02/26/24 0913   Vitals shown include unfiled device data.    Electronically Signed By: PIPO Whyte CRNA  February 26, 2024  9:15 AM

## 2024-02-26 NOTE — ANESTHESIA PREPROCEDURE EVALUATION
Anesthesia Pre-Procedure Evaluation    Patient: Conor Kapadia   MRN: [id number] : 1950        Procedure : Procedure(s):  Right total knee arthroplasty          Past Medical History:   Diagnosis Date    Anxiety     Arrhythmia     Fibromyalgia     Gastroesophageal reflux disease     Herpes simplex     Takes valtrex as needed    Hyperlipidemia     Hypertension       Past Surgical History:   Procedure Laterality Date    ABDOMINAL HYSTERECTOMY  1998    ARTHROSCOPY KNEE  2007    WITH MEDIAL AND LATERAL MENISCECTOMY    CHONDRECTOMY OF SEMILUNAR CARTILAGE OF KNEE  2008    GENITOURINARY SURGERY Left     URETEROSCOPY STONE EXTRACTION    REPLACEMENT TOTAL KNEE      RETINAL LASER PROCEDURE      SALPINGO-OOPHORECTOMY BILATERAL        Allergies   Allergen Reactions    Acetazolamide Other (See Comments)     Cerner list no reaction      Social History     Tobacco Use    Smoking status: Never     Passive exposure: Past    Smokeless tobacco: Never   Substance Use Topics    Alcohol use: Yes     Comment: very rarely      Wt Readings from Last 1 Encounters:   24 95.4 kg (210 lb 6.4 oz)        Anesthesia Evaluation   Pt has had prior anesthetic. Type: General.    No history of anesthetic complications       ROS/MED HX  ENT/Pulmonary:  - neg pulmonary ROS     Neurologic:  - neg neurologic ROS     Cardiovascular: Comment: Has frequent PVCs    (+) Dyslipidemia hypertension- -   -  - -                          Irregular Heartbeat/Palpitations,            METS/Exercise Tolerance:     Hematologic:  - neg hematologic  ROS     Musculoskeletal:  - neg musculoskeletal ROS     GI/Hepatic:     (+) GERD, Asymptomatic on medication,                  Renal/Genitourinary:  - neg Renal ROS     Endo:  - neg endo ROS     Psychiatric/Substance Use:  - neg psychiatric ROS     Infectious Disease:  - neg infectious disease ROS     Malignancy:  - neg malignancy ROS     Other:  - neg other ROS          Physical Exam    Airway       "  Mallampati: I   TM distance: > 3 FB   Neck ROM: full   Mouth opening: > 3 cm    Respiratory Devices and Support         Dental  no notable dental history         Cardiovascular   cardiovascular exam normal          Pulmonary   pulmonary exam normal                OUTSIDE LABS:  CBC: No results found for: \"WBC\", \"HGB\", \"HCT\", \"PLT\"  BMP: No results found for: \"NA\", \"POTASSIUM\", \"CHLORIDE\", \"CO2\", \"BUN\", \"CR\", \"GLC\"  COAGS: No results found for: \"PTT\", \"INR\", \"FIBR\"  POC: No results found for: \"BGM\", \"HCG\", \"HCGS\"  HEPATIC: No results found for: \"ALBUMIN\", \"PROTTOTAL\", \"ALT\", \"AST\", \"GGT\", \"ALKPHOS\", \"BILITOTAL\", \"BILIDIRECT\", \"CARLIE\"  OTHER: No results found for: \"PH\", \"LACT\", \"A1C\", \"KAYLA\", \"PHOS\", \"MAG\", \"LIPASE\", \"AMYLASE\", \"TSH\", \"T4\", \"T3\", \"CRP\", \"SED\"    Anesthesia Plan    ASA Status:  2    NPO Status:  NPO Appropriate    Anesthesia Type: Spinal.              Consents    Anesthesia Plan(s) and associated risks, benefits, and realistic alternatives discussed. Questions answered and patient/representative(s) expressed understanding.     - Discussed:     - Discussed with:  Patient            Postoperative Care    Pain management: IV analgesics, Oral pain medications, Multi-modal analgesia.   PONV prophylaxis: Ondansetron (or other 5HT-3), Dexamethasone or Solumedrol     Comments:               Juan Manuel Beal MD    I have reviewed the pertinent notes and labs in the chart from the past 30 days and (re)examined the patient.  Any updates or changes from those notes are reflected in this note.              # Obesity: Estimated body mass index is 38.48 kg/m  as calculated from the following:    Height as of this encounter: 1.575 m (5' 2\").    Weight as of this encounter: 95.4 kg (210 lb 6.4 oz).      "

## 2024-02-27 ENCOUNTER — APPOINTMENT (OUTPATIENT)
Dept: PHYSICAL THERAPY | Facility: CLINIC | Age: 74
End: 2024-02-27
Attending: ORTHOPAEDIC SURGERY
Payer: COMMERCIAL

## 2024-02-27 ENCOUNTER — APPOINTMENT (OUTPATIENT)
Dept: OCCUPATIONAL THERAPY | Facility: CLINIC | Age: 74
End: 2024-02-27
Attending: ORTHOPAEDIC SURGERY
Payer: COMMERCIAL

## 2024-02-27 VITALS
BODY MASS INDEX: 38.72 KG/M2 | TEMPERATURE: 98.6 F | HEART RATE: 73 BPM | WEIGHT: 210.4 LBS | OXYGEN SATURATION: 96 % | DIASTOLIC BLOOD PRESSURE: 62 MMHG | RESPIRATION RATE: 20 BRPM | SYSTOLIC BLOOD PRESSURE: 145 MMHG | HEIGHT: 62 IN

## 2024-02-27 LAB
FASTING STATUS PATIENT QL REPORTED: YES
GLUCOSE SERPL-MCNC: 101 MG/DL (ref 70–99)
HGB BLD-MCNC: 11.3 G/DL (ref 11.7–15.7)

## 2024-02-27 PROCEDURE — 97165 OT EVAL LOW COMPLEX 30 MIN: CPT | Mod: GO | Performed by: REHABILITATION PRACTITIONER

## 2024-02-27 PROCEDURE — 250N000013 HC RX MED GY IP 250 OP 250 PS 637: Performed by: PHYSICIAN ASSISTANT

## 2024-02-27 PROCEDURE — 96372 THER/PROPH/DIAG INJ SC/IM: CPT | Performed by: ORTHOPAEDIC SURGERY

## 2024-02-27 PROCEDURE — 250N000013 HC RX MED GY IP 250 OP 250 PS 637: Performed by: ORTHOPAEDIC SURGERY

## 2024-02-27 PROCEDURE — 82947 ASSAY GLUCOSE BLOOD QUANT: CPT | Performed by: ORTHOPAEDIC SURGERY

## 2024-02-27 PROCEDURE — 85018 HEMOGLOBIN: CPT | Performed by: ORTHOPAEDIC SURGERY

## 2024-02-27 PROCEDURE — 250N000011 HC RX IP 250 OP 636: Performed by: ORTHOPAEDIC SURGERY

## 2024-02-27 PROCEDURE — 97530 THERAPEUTIC ACTIVITIES: CPT | Mod: GP | Performed by: PHYSICAL THERAPIST

## 2024-02-27 PROCEDURE — 36415 COLL VENOUS BLD VENIPUNCTURE: CPT | Performed by: ORTHOPAEDIC SURGERY

## 2024-02-27 PROCEDURE — 97116 GAIT TRAINING THERAPY: CPT | Mod: GP | Performed by: PHYSICAL THERAPIST

## 2024-02-27 PROCEDURE — 97535 SELF CARE MNGMENT TRAINING: CPT | Mod: GO | Performed by: REHABILITATION PRACTITIONER

## 2024-02-27 RX ADMIN — METOPROLOL TARTRATE 50 MG: 50 TABLET, FILM COATED ORAL at 09:05

## 2024-02-27 RX ADMIN — FAMOTIDINE 20 MG: 20 TABLET ORAL at 09:05

## 2024-02-27 RX ADMIN — ENOXAPARIN SODIUM 40 MG: 40 INJECTION SUBCUTANEOUS at 06:10

## 2024-02-27 RX ADMIN — ACETAMINOPHEN 975 MG: 325 TABLET, FILM COATED ORAL at 04:12

## 2024-02-27 RX ADMIN — CELECOXIB 100 MG: 100 CAPSULE ORAL at 09:05

## 2024-02-27 RX ADMIN — SENNOSIDES AND DOCUSATE SODIUM 1 TABLET: 50; 8.6 TABLET ORAL at 09:05

## 2024-02-27 RX ADMIN — OXYCODONE HYDROCHLORIDE 5 MG: 5 TABLET ORAL at 01:52

## 2024-02-27 RX ADMIN — HYDROXYZINE HYDROCHLORIDE 25 MG: 25 TABLET, FILM COATED ORAL at 01:52

## 2024-02-27 RX ADMIN — OXYCODONE HYDROCHLORIDE 5 MG: 5 TABLET ORAL at 09:53

## 2024-02-27 RX ADMIN — HYDROXYZINE HYDROCHLORIDE 10 MG: 10 TABLET ORAL at 09:53

## 2024-02-27 NOTE — PLAN OF CARE
Physical Therapy Discharge Summary    Reason for therapy discharge:    All goals and outcomes met, no further needs identified.    Progress towards therapy goal(s). See goals on Care Plan in [facility] electronic health record for goal details.  Goals met    Therapy recommendation(s):    Defer to ortho.

## 2024-02-27 NOTE — PROGRESS NOTES
[facility]  OUTPATIENT OCCUPATIONAL THERAPY  EVALUATION  PLAN OF TREATMENT FOR OUTPATIENT REHABILITATION  (COMPLETE FOR INITIAL CLAIMS ONLY)  Patient's Last Name, First Name, M.I.  YOB: 1950  KangConor  FAHEEM                          Provider's Name  [facility] Medical Record No.  [id number]                             Onset Date:  02/26/24   Start of Care Date:  02/27/24   Type:     ___PT   _X_OT   ___SLP Medical Diagnosis:  R TKA                    OT Diagnosis:  decreased ADL/IADls Visits from SOC:  1     See note for plan of treatment, functional goals and certification details    I CERTIFY THE NEED FOR THESE SERVICES FURNISHED UNDER        THIS PLAN OF TREATMENT AND WHILE UNDER MY CARE     (Physician co-signature of this document indicates review and certification of the therapy plan).                       02/27/24 0714   Appointment Info   Signing Clinician's Name / Credentials (OT) Martha Ovalle OTR/L   Quick Adds   Quick Adds Certification   Living Environment   People in Home spouse   Current Living Arrangements house   Home Accessibility stairs to enter home   Number of Stairs, Main Entrance 4   Transportation Anticipated family or friend will provide   Living Environment Comments Pt lives in house with , 4STE with bilateral rails, all needs met on main level. Laundry in basement. Son works from home and is planning to stay with pt this week to assist. Has walk in shower.   Self-Care   Usual Activity Tolerance good   Equipment Currently Used at Home none  (has a commode and RTS)   Fall history within last six months no   Activity/Exercise/Self-Care Comment IND at baseline for mobility and cares   Instrumental Activities of Daily Living (IADL)   IADL Comments spouse to complete as able, son staying with pt until Friday and can A too.   General Information   Onset of Illness/Injury or Date of Surgery 02/26/24   Referring  Physician Dr. Arroyo   Patient/Family Therapy Goal Statement (OT) to return home today   Additional Occupational Profile Info/Pertinent History of Current Problem patient is POD #1 for R TKA   Existing Precautions/Restrictions fall   Right Lower Extremity (Weight-bearing Status) weight-bearing as tolerated (WBAT)   Cognitive Status Examination   Orientation Status orientation to person, place and time   Visual Perception   Visual Impairment/Limitations corrective lenses full-time   Sensory   Sensory Quick Adds sensation intact   Pain Assessment   Patient Currently in Pain Yes, see Vital Sign flowsheet  (rating pain 5/10)   Posture   Posture not impaired   Range of Motion Comprehensive   General Range of Motion bilateral upper extremity ROM WNL   Strength Comprehensive (MMT)   Comment, General Manual Muscle Testing (MMT) Assessment decreased strength in R LE to be expected   Muscle Tone Assessment   Muscle Tone Quick Adds No deficits were identified   Coordination   Upper Extremity Coordination No deficits were identified   Transfers   Transfers sit-stand transfer;toilet transfer;shower transfer   Sit-Stand Transfer   Sit-Stand [place] (Transfers) contact guard   Assistive Device (Sit-Stand Transfers) walker, front-wheeled   Shower Transfer   Type (Shower Transfer) stand pivot/stand step   [place] Level (Shower Transfer) minimum assist (75% patient effort);verbal cues   Assistive Device (Shower Transfer) walker, front-wheeled   Toilet Transfer   Toilet Transfer Comments pt has higher toilets at home, also has AD to use as needed> pt has been getting off low toilet in room and has no concerns with managing at home, declined offer to complete with OT.   Balance   Balance Comments LOB was not noted, general unsteadiness to be expected   Activities of Daily Living   BADL Assessment/Intervention lower body dressing   Lower Body Dressing Assessment/Training   [place] Level (Lower Body Dressing) minimum  assist (75% patient effort);verbal cues   Clinical Impression   Criteria for Skilled Therapeutic Interventions Met (OT) Yes, treatment indicated   OT Diagnosis decreased ADL/IADls   OT Problem List-Impairments impacting ADL activity tolerance impaired;balance;pain;strength;range of motion (ROM)   Assessment of Occupational Performance 5 or more Performance Deficits   Identified Performance Deficits dsg,toileting, bathing, functional/community mobility, household chores, driving, errands   Planned Therapy Interventions (OT) ADL retraining;progressive activity/exercise;transfer training   Clinical Decision Making Complexity (OT) problem focused assessment/low complexity   Risk & Benefits of therapy have been explained evaluation/treatment results reviewed;care plan/treatment goals reviewed;risks/benefits reviewed;current/potential barriers reviewed;participants included;patient   OT Total Evaluation Time   OT Eval, Low Complexity Minutes (83550) 8   Therapy Certification   Medical Diagnosis R TKA   Start of Care Date 02/27/24   Certification date from 02/27/24   Certification date to 02/27/24   OT Goals   Therapy Frequency (OT) One time eval and treatment   OT Predicted Duration/Target Date for Goal Attainment 02/27/24   OT Goals Lower Body Dressing;Transfers;OT Goal 1;OT Goal 2   OT: Lower Body Dressing Supervision/stand-by assist;Goal Met   OT: Transfer Supervision/stand-by assist;with assistive device;Goal Met  (walk in shower)   OT: Goal 1 Patient will verbalize at least 2-3 adaptations to ADLs routines at home to accommodate energy level and safety needs.- goal met   OT: Goal 2 Patient will demonstrate safe use of walker during ADLs.- goal met   OT Discharge Planning   OT Plan dc   OT Rationale for DC Rec defer to ortho team for dc plan- patient has met needed goals for safe discharge home with family to A as needed with IADL's; household chores, driving, errands, cooking and bathing. Patient has all needed AE.  Will discharge from OT services.   OT Brief overview of current status SBA for bed mobility, transfers and mobility.   Total Session Time   Total Session Time (sum of timed and untimed services) 8

## 2024-02-27 NOTE — PROGRESS NOTES
"Orthopedic Surgery  2/27/2024    S: Patient voices no complaints today.     O: Blood pressure (!) 154/77, pulse 76, temperature 97.5  F (36.4  C), temperature source Oral, resp. rate 18, height 1.575 m (5' 2\"), weight 95.4 kg (210 lb 6.4 oz), SpO2 96%.  Lab Results   Component Value Date    HGB 11.3 02/27/2024     No results found for: \"INR\"     Neurovascularly intact.  Calves are negative bilaterally, both soft and nontender.  The wound is C/D/I.  The wound looks good with minimal erythema of the surrounding skin.    A: Ms. Kapadia is doing well status post Procedure(s):  Right total knee arthroplasty.    P: Continue physical therapy.   Anticoagulation with lovenox, home on ASA  Pain management  Discharge planning    Dex Arroyo MD  [phone]    "

## 2024-02-27 NOTE — PROGRESS NOTES
Occupational Therapy Discharge Summary    Reason for therapy discharge:    All goals and outcomes met, no further needs identified.    Progress towards therapy goal(s). See goals on Care Plan in [facility] electronic health record for goal details.  Goals met    Therapy recommendation(s):    No further therapy is recommended.

## 2024-02-27 NOTE — PLAN OF CARE
Patient vital signs are at baseline: Yes  Patient able to ambulate as they were prior to admission or with assist devices provided by therapies during their stay:  No,  Reason:  Assist of 1 with gait belt and walker but doing really well.  Patient MUST void prior to discharge:  Yes  Patient able to tolerate oral intake:  Yes  Pain has adequate pain control using Oral analgesics:  Yes  Does patient have an identified :  Yes  Has goal D/C date and time been discussed with patient:  Yes    /77  Pulse 76   Temp 97.5  F  Resp 18  SpO2 96%      Patient is alert and oriented x4, assist of 1 with gait belt and walker with transfers, continent of bowel and bladder, IV saline locked at 4am. Pain 6/10 and oxycodone, atarax, and tylenol were given through out the night. Slept well at night. Plan is for patient to go home today.

## 2024-02-27 NOTE — PLAN OF CARE
Goal Outcome Evaluation: Pt up with 1 assist gait belt and walker. Alert and oriented. Voiding well and passing gas. Pain controlled with oxy and atarax and tylenol. Discharge meds filled here and gone over with pt as well as discharge instructions. She is ready for discharge as soon as her son who is her ride arrives. Pleasant.

## 2024-02-27 NOTE — PLAN OF CARE
"Goal Outcome Evaluation:      Plan of Care Reviewed With: patient    Time of Care: 7178-9683     Orientation: A/O x 4   VS: BP (!) 147/64 (BP Location: Right arm)   Pulse 80   Temp 98.5  F (36.9  C) (Oral)   Resp 16   Ht 1.575 m (5' 2\")   Wt 95.4 kg (210 lb 6.4 oz)   SpO2 95%   BMI 38.48 kg/m      Pain:Oxy given x 1  &for pain    Activity: Ax 1   Resp: RA   GI/: voiding spontaneously, No BM during time of care   Lines: PIV-SL   Diet: Reg   Plan: Weigh bearing as tolerated   Discharge: Possibly tomorrow.         "

## 2024-10-02 RX ORDER — VALACYCLOVIR HYDROCHLORIDE 500 MG/1
500 TABLET, FILM COATED ORAL 2 TIMES DAILY PRN
COMMUNITY
Start: 2024-04-15

## 2024-10-02 NOTE — PROVIDER NOTIFICATION
10/02/24 1136   Discharge Planning   Patient/Family Anticipates Transition to home with family   Concerns to be Addressed denies needs/concerns at this time   Living Arrangements   People in Home spouse;child(renetta), adult   Type of Residence Private Residence   Is your private residence a single family home or apartment? Single family home   Number of Stairs, Within Home, Primary greater than 10 stairs   Stair Railings, Within Home, Primary railings safe and in good condition   Once home, are you able to live on one level? Yes   Which level? Main Level   Bathroom Shower/Tub Walk-in shower   Equipment Currently Used at Home none   Support System   Support Systems Spouse/Significant Other;Children   Do you have someone available to stay with you one or two nights once you are home? Yes   Medical Clearance   Date of Physical 10/10/24   [facility]   It is recommended that you call and check with any specialty providers before surgery to see if you need surgical clearance.  Do you see any specialty providers outside of your primary care provider? No   Blood   Known Bleeding Disorder or Coagulopathy No   Does the patient have any Amish/cultural preferences related to blood products? No   Relationship/Living Environment   Name(s) of People in Home [place]/spouse and [place]/son

## 2024-10-17 NOTE — PHARMACY-ADMISSION MEDICATION HISTORY
Pre-Admission Medication History  Medication history and patient interview completed by pre-admitting RN or pre-op/PACU RN. Reviewed by pharmacist, including [employer] dispense records, [facility] Care Everywhere, and chart review.       Christiano AminD., [facility]        PTA Med List   Medication Sig Last Dose    acetaminophen (TYLENOL) 325 MG tablet Take 2 tablets (650 mg) by mouth every 4 hours as needed for other (mild pain)     cholecalciferol (VITAMIN D3) 125 mcg (5000 units) capsule Take 125 mcg by mouth daily     cyanocobalamin (VITAMIN B-12) 1000 MCG tablet Take 1,000 mcg by mouth daily     ketoconazole (NIZORAL) 2 % external cream Apply 1 Application topically daily as needed     l-lysine HCl 500 MG TABS tablet Take 500 mg by mouth daily     melatonin 3 MG tablet Take 3 mg by mouth nightly as needed for sleep     metoprolol tartrate (LOPRESSOR) 50 MG tablet Take 1 tablet by mouth 2 times daily     mometasone (ELOCON) 0.1 % external cream Apply topically daily as needed     Omega-3 Fatty Acids (FISH OIL) 1200 MG capsule Take 1,200 mg by mouth daily     Probiotic Product (MISC INTESTINAL DELGADO REGULAT) CAPS Take 1 capsule by mouth daily     rosuvastatin (CRESTOR) 5 MG tablet Take 5 mg by mouth three times a week.     valACYclovir (VALTREX) 500 MG tablet Take 500 mg by mouth 2 times daily as needed.

## 2024-10-21 ENCOUNTER — APPOINTMENT (OUTPATIENT)
Dept: PHYSICAL THERAPY | Facility: CLINIC | Age: 74
DRG: 468 | End: 2024-10-21
Attending: ORTHOPAEDIC SURGERY
Payer: COMMERCIAL

## 2024-10-21 ENCOUNTER — ANESTHESIA EVENT (OUTPATIENT)
Dept: SURGERY | Facility: CLINIC | Age: 74
DRG: 468 | End: 2024-10-21
Payer: COMMERCIAL

## 2024-10-21 ENCOUNTER — HOSPITAL ENCOUNTER (INPATIENT)
Facility: CLINIC | Age: 74
LOS: 1 days | Discharge: HOME | DRG: 468 | End: 2024-10-22
Attending: ORTHOPAEDIC SURGERY | Admitting: ORTHOPAEDIC SURGERY
Payer: COMMERCIAL

## 2024-10-21 ENCOUNTER — ANESTHESIA (OUTPATIENT)
Dept: SURGERY | Facility: CLINIC | Age: 74
DRG: 468 | End: 2024-10-21
Payer: COMMERCIAL

## 2024-10-21 ENCOUNTER — APPOINTMENT (OUTPATIENT)
Dept: GENERAL RADIOLOGY | Facility: CLINIC | Age: 74
DRG: 468 | End: 2024-10-21
Attending: ORTHOPAEDIC SURGERY
Payer: COMMERCIAL

## 2024-10-21 DIAGNOSIS — Z96.652 S/P REVISION OF TOTAL KNEE, LEFT: Primary | ICD-10-CM

## 2024-10-21 LAB
BACTERIA SPEC CULT: NORMAL
CREAT SERPL-MCNC: 0.55 MG/DL (ref 0.51–0.95)
EGFRCR SERPLBLD CKD-EPI 2021: >90 ML/MIN/1.73M2
GLUCOSE BLDC GLUCOMTR-MCNC: 107 MG/DL (ref 70–99)
GRAM STAIN RESULT: NORMAL

## 2024-10-21 PROCEDURE — 250N000011 HC RX IP 250 OP 636: Performed by: ORTHOPAEDIC SURGERY

## 2024-10-21 PROCEDURE — 250N000011 HC RX IP 250 OP 636

## 2024-10-21 PROCEDURE — 710N000009 HC RECOVERY PHASE 1, LEVEL 1, PER MIN: Performed by: ORTHOPAEDIC SURGERY

## 2024-10-21 PROCEDURE — 99222 1ST HOSP IP/OBS MODERATE 55: CPT | Performed by: INTERNAL MEDICINE

## 2024-10-21 PROCEDURE — 36415 COLL VENOUS BLD VENIPUNCTURE: CPT | Performed by: ORTHOPAEDIC SURGERY

## 2024-10-21 PROCEDURE — 250N000013 HC RX MED GY IP 250 OP 250 PS 637: Performed by: INTERNAL MEDICINE

## 2024-10-21 PROCEDURE — 87070 CULTURE OTHR SPECIMN AEROBIC: CPT | Performed by: ORTHOPAEDIC SURGERY

## 2024-10-21 PROCEDURE — 360N000078 HC SURGERY LEVEL 5, PER MIN: Performed by: ORTHOPAEDIC SURGERY

## 2024-10-21 PROCEDURE — 999N000141 HC STATISTIC PRE-PROCEDURE NURSING ASSESSMENT: Performed by: ORTHOPAEDIC SURGERY

## 2024-10-21 PROCEDURE — 250N000009 HC RX 250: Performed by: ANESTHESIOLOGY

## 2024-10-21 PROCEDURE — 250N000011 HC RX IP 250 OP 636: Performed by: ANESTHESIOLOGY

## 2024-10-21 PROCEDURE — 250N000025 HC SEVOFLURANE, PER MIN: Performed by: ORTHOPAEDIC SURGERY

## 2024-10-21 PROCEDURE — 272N000001 HC OR GENERAL SUPPLY STERILE: Performed by: ORTHOPAEDIC SURGERY

## 2024-10-21 PROCEDURE — 0SPD0JZ REMOVAL OF SYNTHETIC SUBSTITUTE FROM LEFT KNEE JOINT, OPEN APPROACH: ICD-10-PCS | Performed by: ORTHOPAEDIC SURGERY

## 2024-10-21 PROCEDURE — 250N000013 HC RX MED GY IP 250 OP 250 PS 637: Performed by: ORTHOPAEDIC SURGERY

## 2024-10-21 PROCEDURE — 250N000009 HC RX 250

## 2024-10-21 PROCEDURE — 82565 ASSAY OF CREATININE: CPT | Performed by: ORTHOPAEDIC SURGERY

## 2024-10-21 PROCEDURE — 370N000017 HC ANESTHESIA TECHNICAL FEE, PER MIN: Performed by: ORTHOPAEDIC SURGERY

## 2024-10-21 PROCEDURE — 97530 THERAPEUTIC ACTIVITIES: CPT | Mod: GP | Performed by: PHYSICAL THERAPIST

## 2024-10-21 PROCEDURE — 258N000003 HC RX IP 258 OP 636

## 2024-10-21 PROCEDURE — 250N000011 HC RX IP 250 OP 636: Performed by: PHYSICIAN ASSISTANT

## 2024-10-21 PROCEDURE — 250N000013 HC RX MED GY IP 250 OP 250 PS 637: Performed by: PHYSICIAN ASSISTANT

## 2024-10-21 PROCEDURE — C1713 ANCHOR/SCREW BN/BN,TIS/BN: HCPCS | Performed by: ORTHOPAEDIC SURGERY

## 2024-10-21 PROCEDURE — 87205 SMEAR GRAM STAIN: CPT | Performed by: ORTHOPAEDIC SURGERY

## 2024-10-21 PROCEDURE — 97116 GAIT TRAINING THERAPY: CPT | Mod: GP | Performed by: PHYSICAL THERAPIST

## 2024-10-21 PROCEDURE — 120N000001 HC R&B MED SURG/OB

## 2024-10-21 PROCEDURE — 258N000003 HC RX IP 258 OP 636: Performed by: ANESTHESIOLOGY

## 2024-10-21 PROCEDURE — 0SRD0J9 REPLACEMENT OF LEFT KNEE JOINT WITH SYNTHETIC SUBSTITUTE, CEMENTED, OPEN APPROACH: ICD-10-PCS | Performed by: ORTHOPAEDIC SURGERY

## 2024-10-21 PROCEDURE — 97161 PT EVAL LOW COMPLEX 20 MIN: CPT | Mod: GP | Performed by: PHYSICAL THERAPIST

## 2024-10-21 PROCEDURE — 258N000001 HC RX 258: Performed by: ORTHOPAEDIC SURGERY

## 2024-10-21 PROCEDURE — 87075 CULTR BACTERIA EXCEPT BLOOD: CPT | Performed by: ORTHOPAEDIC SURGERY

## 2024-10-21 PROCEDURE — C1776 JOINT DEVICE (IMPLANTABLE): HCPCS | Performed by: ORTHOPAEDIC SURGERY

## 2024-10-21 PROCEDURE — 999N000065 XR KNEE PORT LEFT 1/2 VIEWS: Mod: LT

## 2024-10-21 PROCEDURE — 97110 THERAPEUTIC EXERCISES: CPT | Mod: GP | Performed by: PHYSICAL THERAPIST

## 2024-10-21 DEVICE — IMPLANTABLE DEVICE
Type: IMPLANTABLE DEVICE | Site: KNEE | Status: FUNCTIONAL
Brand: PERSONA®

## 2024-10-21 DEVICE — BONE CEMENT SIMPLEX W/TOBRAMYCIN 6197-9-001: Type: IMPLANTABLE DEVICE | Site: KNEE | Status: FUNCTIONAL

## 2024-10-21 RX ORDER — HYDRALAZINE HYDROCHLORIDE 20 MG/ML
2.5-5 INJECTION INTRAMUSCULAR; INTRAVENOUS EVERY 10 MIN PRN
Status: DISCONTINUED | OUTPATIENT
Start: 2024-10-21 | End: 2024-10-21 | Stop reason: HOSPADM

## 2024-10-21 RX ORDER — HYDROMORPHONE HCL IN WATER/PF 6 MG/30 ML
0.2 PATIENT CONTROLLED ANALGESIA SYRINGE INTRAVENOUS
Status: DISCONTINUED | OUTPATIENT
Start: 2024-10-21 | End: 2024-10-22 | Stop reason: HOSPADM

## 2024-10-21 RX ORDER — BUPIVACAINE HYDROCHLORIDE AND EPINEPHRINE 2.5; 5 MG/ML; UG/ML
INJECTION, SOLUTION INFILTRATION; PERINEURAL
Status: COMPLETED | OUTPATIENT
Start: 2024-10-21 | End: 2024-10-21

## 2024-10-21 RX ORDER — LIDOCAINE HYDROCHLORIDE 20 MG/ML
INJECTION, SOLUTION INFILTRATION; PERINEURAL PRN
Status: DISCONTINUED | OUTPATIENT
Start: 2024-10-21 | End: 2024-10-21

## 2024-10-21 RX ORDER — CEFAZOLIN SODIUM/WATER 2 G/20 ML
2 SYRINGE (ML) INTRAVENOUS
Status: COMPLETED | OUTPATIENT
Start: 2024-10-21 | End: 2024-10-21

## 2024-10-21 RX ORDER — FENTANYL CITRATE 50 UG/ML
25 INJECTION, SOLUTION INTRAMUSCULAR; INTRAVENOUS EVERY 5 MIN PRN
Status: DISCONTINUED | OUTPATIENT
Start: 2024-10-21 | End: 2024-10-21 | Stop reason: HOSPADM

## 2024-10-21 RX ORDER — ACETAMINOPHEN 325 MG/1
650 TABLET ORAL EVERY 4 HOURS PRN
Status: DISCONTINUED | OUTPATIENT
Start: 2024-10-24 | End: 2024-10-22 | Stop reason: HOSPADM

## 2024-10-21 RX ORDER — ACETAMINOPHEN 325 MG/1
975 TABLET ORAL EVERY 8 HOURS
Status: DISCONTINUED | OUTPATIENT
Start: 2024-10-21 | End: 2024-10-22 | Stop reason: HOSPADM

## 2024-10-21 RX ORDER — SODIUM CHLORIDE, SODIUM LACTATE, POTASSIUM CHLORIDE, CALCIUM CHLORIDE 600; 310; 30; 20 MG/100ML; MG/100ML; MG/100ML; MG/100ML
INJECTION, SOLUTION INTRAVENOUS CONTINUOUS
Status: DISCONTINUED | OUTPATIENT
Start: 2024-10-21 | End: 2024-10-21 | Stop reason: HOSPADM

## 2024-10-21 RX ORDER — LANOLIN ALCOHOL/MO/W.PET/CERES
1000 CREAM (GRAM) TOPICAL DAILY
Status: DISCONTINUED | OUTPATIENT
Start: 2024-10-21 | End: 2024-10-22 | Stop reason: HOSPADM

## 2024-10-21 RX ORDER — KETOCONAZOLE 20 MG/G
1 CREAM TOPICAL DAILY PRN
Status: DISCONTINUED | OUTPATIENT
Start: 2024-10-21 | End: 2024-10-22 | Stop reason: HOSPADM

## 2024-10-21 RX ORDER — ASPIRIN 325 MG
TABLET, DELAYED RELEASE (ENTERIC COATED) ORAL
Qty: 70 TABLET | Refills: 0 | Status: SHIPPED | OUTPATIENT
Start: 2024-10-21

## 2024-10-21 RX ORDER — SCOLOPAMINE TRANSDERMAL SYSTEM 1 MG/1
1 PATCH, EXTENDED RELEASE TRANSDERMAL
Status: DISCONTINUED | OUTPATIENT
Start: 2024-10-21 | End: 2024-10-21 | Stop reason: HOSPADM

## 2024-10-21 RX ORDER — HYDROXYZINE HYDROCHLORIDE 25 MG/1
25-50 TABLET, FILM COATED ORAL EVERY 6 HOURS PRN
Qty: 60 TABLET | Refills: 0 | Status: SHIPPED | OUTPATIENT
Start: 2024-10-21

## 2024-10-21 RX ORDER — OXYCODONE HYDROCHLORIDE 10 MG/1
10 TABLET ORAL EVERY 4 HOURS PRN
Status: DISCONTINUED | OUTPATIENT
Start: 2024-10-21 | End: 2024-10-22 | Stop reason: HOSPADM

## 2024-10-21 RX ORDER — PROCHLORPERAZINE MALEATE 5 MG/1
5 TABLET ORAL EVERY 6 HOURS PRN
Status: DISCONTINUED | OUTPATIENT
Start: 2024-10-21 | End: 2024-10-22 | Stop reason: HOSPADM

## 2024-10-21 RX ORDER — AMOXICILLIN 250 MG
1 CAPSULE ORAL 2 TIMES DAILY
Qty: 30 TABLET | Refills: 0 | Status: SHIPPED | OUTPATIENT
Start: 2024-10-21

## 2024-10-21 RX ORDER — FAMOTIDINE 20 MG/1
20 TABLET, FILM COATED ORAL 2 TIMES DAILY
Status: DISCONTINUED | OUTPATIENT
Start: 2024-10-21 | End: 2024-10-22 | Stop reason: HOSPADM

## 2024-10-21 RX ORDER — FENTANYL CITRATE 50 UG/ML
50 INJECTION, SOLUTION INTRAMUSCULAR; INTRAVENOUS EVERY 5 MIN PRN
Status: DISCONTINUED | OUTPATIENT
Start: 2024-10-21 | End: 2024-10-21 | Stop reason: HOSPADM

## 2024-10-21 RX ORDER — CEFAZOLIN SODIUM 2 G/100ML
2 INJECTION, SOLUTION INTRAVENOUS EVERY 8 HOURS
Status: COMPLETED | OUTPATIENT
Start: 2024-10-21 | End: 2024-10-21

## 2024-10-21 RX ORDER — METOPROLOL TARTRATE 50 MG
50 TABLET ORAL 2 TIMES DAILY
Status: DISCONTINUED | OUTPATIENT
Start: 2024-10-21 | End: 2024-10-22 | Stop reason: HOSPADM

## 2024-10-21 RX ORDER — AMOXICILLIN 250 MG
1 CAPSULE ORAL 2 TIMES DAILY
Status: DISCONTINUED | OUTPATIENT
Start: 2024-10-21 | End: 2024-10-22 | Stop reason: HOSPADM

## 2024-10-21 RX ORDER — OXYCODONE HYDROCHLORIDE 5 MG/1
5-10 TABLET ORAL EVERY 4 HOURS PRN
Qty: 25 TABLET | Refills: 0 | Status: SHIPPED | OUTPATIENT
Start: 2024-10-21 | End: 2024-10-24

## 2024-10-21 RX ORDER — NALOXONE HYDROCHLORIDE 0.4 MG/ML
0.4 INJECTION, SOLUTION INTRAMUSCULAR; INTRAVENOUS; SUBCUTANEOUS
Status: DISCONTINUED | OUTPATIENT
Start: 2024-10-21 | End: 2024-10-22 | Stop reason: HOSPADM

## 2024-10-21 RX ORDER — DEXAMETHASONE SODIUM PHOSPHATE 4 MG/ML
4 INJECTION, SOLUTION INTRA-ARTICULAR; INTRALESIONAL; INTRAMUSCULAR; INTRAVENOUS; SOFT TISSUE
Status: DISCONTINUED | OUTPATIENT
Start: 2024-10-21 | End: 2024-10-21 | Stop reason: HOSPADM

## 2024-10-21 RX ORDER — VANCOMYCIN HYDROCHLORIDE 1 G/20ML
INJECTION, POWDER, LYOPHILIZED, FOR SOLUTION INTRAVENOUS PRN
Status: DISCONTINUED | OUTPATIENT
Start: 2024-10-21 | End: 2024-10-21 | Stop reason: HOSPADM

## 2024-10-21 RX ORDER — HYDROMORPHONE HCL IN WATER/PF 6 MG/30 ML
0.4 PATIENT CONTROLLED ANALGESIA SYRINGE INTRAVENOUS
Status: DISCONTINUED | OUTPATIENT
Start: 2024-10-21 | End: 2024-10-22 | Stop reason: HOSPADM

## 2024-10-21 RX ORDER — BISACODYL 10 MG
10 SUPPOSITORY, RECTAL RECTAL DAILY PRN
Status: DISCONTINUED | OUTPATIENT
Start: 2024-10-21 | End: 2024-10-22 | Stop reason: HOSPADM

## 2024-10-21 RX ORDER — NALOXONE HYDROCHLORIDE 0.4 MG/ML
0.2 INJECTION, SOLUTION INTRAMUSCULAR; INTRAVENOUS; SUBCUTANEOUS
Status: DISCONTINUED | OUTPATIENT
Start: 2024-10-21 | End: 2024-10-22 | Stop reason: HOSPADM

## 2024-10-21 RX ORDER — PROPOFOL 10 MG/ML
INJECTION, EMULSION INTRAVENOUS PRN
Status: DISCONTINUED | OUTPATIENT
Start: 2024-10-21 | End: 2024-10-21

## 2024-10-21 RX ORDER — FENTANYL CITRATE 50 UG/ML
INJECTION, SOLUTION INTRAMUSCULAR; INTRAVENOUS PRN
Status: DISCONTINUED | OUTPATIENT
Start: 2024-10-21 | End: 2024-10-21

## 2024-10-21 RX ORDER — DEXAMETHASONE SODIUM PHOSPHATE 4 MG/ML
INJECTION, SOLUTION INTRA-ARTICULAR; INTRALESIONAL; INTRAMUSCULAR; INTRAVENOUS; SOFT TISSUE PRN
Status: DISCONTINUED | OUTPATIENT
Start: 2024-10-21 | End: 2024-10-21

## 2024-10-21 RX ORDER — ONDANSETRON 2 MG/ML
8 INJECTION INTRAMUSCULAR; INTRAVENOUS EVERY 6 HOURS PRN
Status: DISCONTINUED | OUTPATIENT
Start: 2024-10-21 | End: 2024-10-22 | Stop reason: HOSPADM

## 2024-10-21 RX ORDER — ONDANSETRON 2 MG/ML
INJECTION INTRAMUSCULAR; INTRAVENOUS PRN
Status: DISCONTINUED | OUTPATIENT
Start: 2024-10-21 | End: 2024-10-21

## 2024-10-21 RX ORDER — ROSUVASTATIN CALCIUM 5 MG/1
5 TABLET, COATED ORAL
Status: DISCONTINUED | OUTPATIENT
Start: 2024-10-21 | End: 2024-10-22 | Stop reason: HOSPADM

## 2024-10-21 RX ORDER — CELECOXIB 100 MG/1
100 CAPSULE ORAL 2 TIMES DAILY
Status: DISCONTINUED | OUTPATIENT
Start: 2024-10-21 | End: 2024-10-22 | Stop reason: HOSPADM

## 2024-10-21 RX ORDER — LIDOCAINE 40 MG/G
CREAM TOPICAL
Status: DISCONTINUED | OUTPATIENT
Start: 2024-10-21 | End: 2024-10-22 | Stop reason: HOSPADM

## 2024-10-21 RX ORDER — ACETAMINOPHEN 325 MG/1
975 TABLET ORAL ONCE
Status: COMPLETED | OUTPATIENT
Start: 2024-10-21 | End: 2024-10-21

## 2024-10-21 RX ORDER — ONDANSETRON 4 MG/1
4 TABLET, ORALLY DISINTEGRATING ORAL EVERY 30 MIN PRN
Status: DISCONTINUED | OUTPATIENT
Start: 2024-10-21 | End: 2024-10-21 | Stop reason: HOSPADM

## 2024-10-21 RX ORDER — ENOXAPARIN SODIUM 100 MG/ML
40 INJECTION SUBCUTANEOUS EVERY 24 HOURS
Status: DISCONTINUED | OUTPATIENT
Start: 2024-10-22 | End: 2024-10-22 | Stop reason: HOSPADM

## 2024-10-21 RX ORDER — MOMETASONE FUROATE 1 MG/G
CREAM TOPICAL DAILY PRN
Status: DISCONTINUED | OUTPATIENT
Start: 2024-10-21 | End: 2024-10-22 | Stop reason: HOSPADM

## 2024-10-21 RX ORDER — ALBUTEROL SULFATE 0.83 MG/ML
2.5 SOLUTION RESPIRATORY (INHALATION) EVERY 4 HOURS PRN
Status: DISCONTINUED | OUTPATIENT
Start: 2024-10-21 | End: 2024-10-21 | Stop reason: HOSPADM

## 2024-10-21 RX ORDER — HYDROMORPHONE HCL IN WATER/PF 6 MG/30 ML
0.2 PATIENT CONTROLLED ANALGESIA SYRINGE INTRAVENOUS EVERY 5 MIN PRN
Status: DISCONTINUED | OUTPATIENT
Start: 2024-10-21 | End: 2024-10-21 | Stop reason: HOSPADM

## 2024-10-21 RX ORDER — CEFAZOLIN SODIUM/WATER 2 G/20 ML
2 SYRINGE (ML) INTRAVENOUS SEE ADMIN INSTRUCTIONS
Status: DISCONTINUED | OUTPATIENT
Start: 2024-10-21 | End: 2024-10-21

## 2024-10-21 RX ORDER — ONDANSETRON 4 MG/1
4 TABLET, ORALLY DISINTEGRATING ORAL EVERY 6 HOURS PRN
Status: DISCONTINUED | OUTPATIENT
Start: 2024-10-21 | End: 2024-10-21

## 2024-10-21 RX ORDER — HYDROXYZINE HYDROCHLORIDE 10 MG/1
10 TABLET, FILM COATED ORAL EVERY 6 HOURS PRN
Status: DISCONTINUED | OUTPATIENT
Start: 2024-10-21 | End: 2024-10-22 | Stop reason: HOSPADM

## 2024-10-21 RX ORDER — ACETAMINOPHEN 325 MG/1
650 TABLET ORAL EVERY 6 HOURS
Qty: 100 TABLET | Refills: 0 | Status: SHIPPED | OUTPATIENT
Start: 2024-10-21

## 2024-10-21 RX ORDER — LABETALOL HYDROCHLORIDE 5 MG/ML
10 INJECTION, SOLUTION INTRAVENOUS
Status: DISCONTINUED | OUTPATIENT
Start: 2024-10-21 | End: 2024-10-21 | Stop reason: HOSPADM

## 2024-10-21 RX ORDER — NALOXONE HYDROCHLORIDE 0.4 MG/ML
0.1 INJECTION, SOLUTION INTRAMUSCULAR; INTRAVENOUS; SUBCUTANEOUS
Status: DISCONTINUED | OUTPATIENT
Start: 2024-10-21 | End: 2024-10-21 | Stop reason: HOSPADM

## 2024-10-21 RX ORDER — LACTOBACILLUS RHAMNOSUS GG 10B CELL
1 CAPSULE ORAL DAILY
Status: DISCONTINUED | OUTPATIENT
Start: 2024-10-21 | End: 2024-10-22 | Stop reason: HOSPADM

## 2024-10-21 RX ORDER — TRANEXAMIC ACID 650 MG/1
1950 TABLET ORAL ONCE
Status: COMPLETED | OUTPATIENT
Start: 2024-10-21 | End: 2024-10-21

## 2024-10-21 RX ORDER — VALACYCLOVIR HYDROCHLORIDE 500 MG/1
500 TABLET, FILM COATED ORAL 2 TIMES DAILY PRN
Status: DISCONTINUED | OUTPATIENT
Start: 2024-10-21 | End: 2024-10-22 | Stop reason: HOSPADM

## 2024-10-21 RX ORDER — SODIUM CHLORIDE, SODIUM LACTATE, POTASSIUM CHLORIDE, CALCIUM CHLORIDE 600; 310; 30; 20 MG/100ML; MG/100ML; MG/100ML; MG/100ML
INJECTION, SOLUTION INTRAVENOUS CONTINUOUS
Status: DISCONTINUED | OUTPATIENT
Start: 2024-10-21 | End: 2024-10-22 | Stop reason: HOSPADM

## 2024-10-21 RX ORDER — OXYCODONE HYDROCHLORIDE 5 MG/1
5 TABLET ORAL EVERY 4 HOURS PRN
Status: DISCONTINUED | OUTPATIENT
Start: 2024-10-21 | End: 2024-10-22 | Stop reason: HOSPADM

## 2024-10-21 RX ORDER — ONDANSETRON 2 MG/ML
4 INJECTION INTRAMUSCULAR; INTRAVENOUS EVERY 6 HOURS PRN
Status: DISCONTINUED | OUTPATIENT
Start: 2024-10-21 | End: 2024-10-21

## 2024-10-21 RX ORDER — PROPOFOL 10 MG/ML
INJECTION, EMULSION INTRAVENOUS CONTINUOUS PRN
Status: DISCONTINUED | OUTPATIENT
Start: 2024-10-21 | End: 2024-10-21

## 2024-10-21 RX ORDER — POLYETHYLENE GLYCOL 3350 17 G/17G
17 POWDER, FOR SOLUTION ORAL DAILY
Status: DISCONTINUED | OUTPATIENT
Start: 2024-10-22 | End: 2024-10-22 | Stop reason: HOSPADM

## 2024-10-21 RX ORDER — ONDANSETRON 4 MG/1
8 TABLET, ORALLY DISINTEGRATING ORAL EVERY 6 HOURS PRN
Status: DISCONTINUED | OUTPATIENT
Start: 2024-10-21 | End: 2024-10-22 | Stop reason: HOSPADM

## 2024-10-21 RX ORDER — ONDANSETRON 2 MG/ML
4 INJECTION INTRAMUSCULAR; INTRAVENOUS EVERY 30 MIN PRN
Status: DISCONTINUED | OUTPATIENT
Start: 2024-10-21 | End: 2024-10-21 | Stop reason: HOSPADM

## 2024-10-21 RX ORDER — HYDROMORPHONE HCL IN WATER/PF 6 MG/30 ML
0.4 PATIENT CONTROLLED ANALGESIA SYRINGE INTRAVENOUS EVERY 5 MIN PRN
Status: DISCONTINUED | OUTPATIENT
Start: 2024-10-21 | End: 2024-10-21 | Stop reason: HOSPADM

## 2024-10-21 RX ORDER — SODIUM CHLORIDE, SODIUM LACTATE, POTASSIUM CHLORIDE, CALCIUM CHLORIDE 600; 310; 30; 20 MG/100ML; MG/100ML; MG/100ML; MG/100ML
INJECTION, SOLUTION INTRAVENOUS CONTINUOUS PRN
Status: DISCONTINUED | OUTPATIENT
Start: 2024-10-21 | End: 2024-10-21

## 2024-10-21 RX ADMIN — DEXAMETHASONE SODIUM PHOSPHATE 4 MG: 4 INJECTION, SOLUTION INTRA-ARTICULAR; INTRALESIONAL; INTRAMUSCULAR; INTRAVENOUS; SOFT TISSUE at 07:26

## 2024-10-21 RX ADMIN — ACETAMINOPHEN 975 MG: 325 TABLET, FILM COATED ORAL at 06:15

## 2024-10-21 RX ADMIN — OXYCODONE HYDROCHLORIDE 5 MG: 5 TABLET ORAL at 22:34

## 2024-10-21 RX ADMIN — FENTANYL CITRATE 50 MCG: 0.05 INJECTION, SOLUTION INTRAMUSCULAR; INTRAVENOUS at 11:23

## 2024-10-21 RX ADMIN — PROCHLORPERAZINE EDISYLATE 5 MG: 5 INJECTION INTRAMUSCULAR; INTRAVENOUS at 14:26

## 2024-10-21 RX ADMIN — SODIUM CHLORIDE, POTASSIUM CHLORIDE, SODIUM LACTATE AND CALCIUM CHLORIDE: 600; 310; 30; 20 INJECTION, SOLUTION INTRAVENOUS at 06:45

## 2024-10-21 RX ADMIN — FAMOTIDINE 20 MG: 20 TABLET ORAL at 20:28

## 2024-10-21 RX ADMIN — ACETAMINOPHEN 325MG 975 MG: 325 TABLET ORAL at 15:38

## 2024-10-21 RX ADMIN — SODIUM CHLORIDE, POTASSIUM CHLORIDE, SODIUM LACTATE AND CALCIUM CHLORIDE: 600; 310; 30; 20 INJECTION, SOLUTION INTRAVENOUS at 07:25

## 2024-10-21 RX ADMIN — SCOPOLAMINE 1 PATCH: 1.5 PATCH, EXTENDED RELEASE TRANSDERMAL at 07:12

## 2024-10-21 RX ADMIN — HYDROMORPHONE HYDROCHLORIDE 0.5 MG: 1 INJECTION, SOLUTION INTRAMUSCULAR; INTRAVENOUS; SUBCUTANEOUS at 09:17

## 2024-10-21 RX ADMIN — PROPOFOL 50 MCG/KG/MIN: 10 INJECTION, EMULSION INTRAVENOUS at 07:26

## 2024-10-21 RX ADMIN — CEFAZOLIN SODIUM 2 G: 2 INJECTION, SOLUTION INTRAVENOUS at 22:31

## 2024-10-21 RX ADMIN — SODIUM CHLORIDE, POTASSIUM CHLORIDE, SODIUM LACTATE AND CALCIUM CHLORIDE: 600; 310; 30; 20 INJECTION, SOLUTION INTRAVENOUS at 11:31

## 2024-10-21 RX ADMIN — Medication 2 G: at 07:25

## 2024-10-21 RX ADMIN — BUPIVACAINE HYDROCHLORIDE AND EPINEPHRINE BITARTRATE 20 ML: 2.5; .005 INJECTION, SOLUTION INFILTRATION; PERINEURAL at 07:17

## 2024-10-21 RX ADMIN — FENTANYL CITRATE 100 MCG: 50 INJECTION INTRAMUSCULAR; INTRAVENOUS at 07:27

## 2024-10-21 RX ADMIN — CEFAZOLIN SODIUM 2 G: 2 INJECTION, SOLUTION INTRAVENOUS at 14:43

## 2024-10-21 RX ADMIN — PROPOFOL 150 MG: 10 INJECTION, EMULSION INTRAVENOUS at 07:26

## 2024-10-21 RX ADMIN — HYDROMORPHONE HYDROCHLORIDE 0.2 MG: 0.2 INJECTION, SOLUTION INTRAMUSCULAR; INTRAVENOUS; SUBCUTANEOUS at 14:43

## 2024-10-21 RX ADMIN — ONDANSETRON 4 MG: 2 INJECTION INTRAMUSCULAR; INTRAVENOUS at 10:13

## 2024-10-21 RX ADMIN — HYDROMORPHONE HYDROCHLORIDE 0.4 MG: 0.2 INJECTION, SOLUTION INTRAMUSCULAR; INTRAVENOUS; SUBCUTANEOUS at 11:31

## 2024-10-21 RX ADMIN — LIDOCAINE HYDROCHLORIDE 50 MG: 20 INJECTION, SOLUTION INFILTRATION; PERINEURAL at 07:26

## 2024-10-21 RX ADMIN — TRANEXAMIC ACID 1950 MG: 650 TABLET ORAL at 06:15

## 2024-10-21 RX ADMIN — ACETAMINOPHEN 325MG 975 MG: 325 TABLET ORAL at 22:30

## 2024-10-21 RX ADMIN — METOPROLOL TARTRATE 50 MG: 50 TABLET, FILM COATED ORAL at 20:28

## 2024-10-21 RX ADMIN — HYDROMORPHONE HYDROCHLORIDE 0.5 MG: 1 INJECTION, SOLUTION INTRAMUSCULAR; INTRAVENOUS; SUBCUTANEOUS at 07:53

## 2024-10-21 RX ADMIN — ONDANSETRON 4 MG: 2 INJECTION INTRAMUSCULAR; INTRAVENOUS at 12:27

## 2024-10-21 RX ADMIN — HYDROMORPHONE HYDROCHLORIDE 0.4 MG: 0.2 INJECTION, SOLUTION INTRAMUSCULAR; INTRAVENOUS; SUBCUTANEOUS at 11:40

## 2024-10-21 RX ADMIN — FENTANYL CITRATE 50 MCG: 0.05 INJECTION, SOLUTION INTRAMUSCULAR; INTRAVENOUS at 11:05

## 2024-10-21 RX ADMIN — SENNOSIDES AND DOCUSATE SODIUM 1 TABLET: 8.6; 5 TABLET ORAL at 20:28

## 2024-10-21 RX ADMIN — OXYCODONE HYDROCHLORIDE 5 MG: 5 TABLET ORAL at 15:38

## 2024-10-21 RX ADMIN — CELECOXIB 100 MG: 100 CAPSULE ORAL at 20:28

## 2024-10-21 ASSESSMENT — ACTIVITIES OF DAILY LIVING (ADL)
ADLS_ACUITY_SCORE: 25
ADLS_ACUITY_SCORE: 25
ADLS_ACUITY_SCORE: 26
ADLS_ACUITY_SCORE: 24
ADLS_ACUITY_SCORE: 26
ADLS_ACUITY_SCORE: 25
ADLS_ACUITY_SCORE: 24
ADLS_ACUITY_SCORE: 24
ADLS_ACUITY_SCORE: 26
ADLS_ACUITY_SCORE: 26
ADLS_ACUITY_SCORE: 24
ADLS_ACUITY_SCORE: 25
ADLS_ACUITY_SCORE: 25
ADLS_ACUITY_SCORE: 24

## 2024-10-21 NOTE — ANESTHESIA PREPROCEDURE EVALUATION
Anesthesia Pre-Procedure Evaluation    Patient: Conor Kapadia   MRN: [id number] : 1950        Procedure : Procedure(s):  Revision left total knee arthroplasty          Past Medical History:   Diagnosis Date    Anxiety     Arrhythmia     Fibromyalgia     Gastroesophageal reflux disease     Herpes simplex     Takes valtrex as needed    Hyperlipidemia     Hypertension       Past Surgical History:   Procedure Laterality Date    ABDOMINAL HYSTERECTOMY  1998    ARTHROPLASTY KNEE Right 2024    Procedure: Right total knee arthroplasty using Arthrex ibalance total knee with a size 5  posterior stabilized femoral component, a 4 universal tibial baseplate, a 8 mm posterior stabilized tibial insert and a 27 symmetric round patellar button.;  Surgeon: Dex Arroyo MD;  Location: [facility] OR    ARTHROSCOPY KNEE  2007    WITH MEDIAL AND LATERAL MENISCECTOMY    CHONDRECTOMY OF SEMILUNAR CARTILAGE OF KNEE      GENITOURINARY SURGERY Left     URETEROSCOPY STONE EXTRACTION    REPLACEMENT TOTAL KNEE Left     RETINAL LASER PROCEDURE      SALPINGO-OOPHORECTOMY BILATERAL        Allergies   Allergen Reactions    Acetazolamide Other (See Comments)     Diamox, swelling of tongue      Social History     Tobacco Use    Smoking status: Never     Passive exposure: Past    Smokeless tobacco: Never   Substance Use Topics    Alcohol use: Yes     Comment: very rarely      Wt Readings from Last 1 Encounters:   10/21/24 94.2 kg (207 lb 9.6 oz)        Anesthesia Evaluation            ROS/MED HX  ENT/Pulmonary:  - neg pulmonary ROS     Neurologic:       Cardiovascular: Comment: History of PVCs    Echo 2024  LEFT VENTRICLE:Normal left ventricular chamber size. Abnormal left ventricular geometry with  eccentric left ventricular hypertrophy. Calculated 2-D linear left ventricular ejection fraction 63%. No regional wall motion abnormalities. Mildly elevated   left ventricular filling pressure.   RIGHT VENTRICLE:Normal  right ventricular chamber size by visual estimate. Normal right ventricular systolic function. Estimated right ventricular systolic pressure 36 mmHg (right atrial pressure of 5 mmHg).   ATRIA:Mildly enlarged left atrial size. Left atrial volume index 35 ml/m2. Normal right atrial size by visual estimate.   CARDIAC VALVES:Trileaflet aortic valve. Sclerotic aortic valve. Trivial aortic valve regurgitation. Calcified mitral valve. Mild mitral valve regurgitation. Normal pulmonary valve. Normal pulmonary valve systolic velocities. Trivial pulmonary valve   regurgitation. Normal tricuspid valve. Trivial tricuspid valve regurgitation.   OTHER ECHO FINDINGS:Normal inferior vena cava size with normal inspiratory collapse (>50%). Normal mid ascending aorta diameter of 36 mm. No abdominal aortic aneurysm. Normal abdominal aorta Doppler flow pattern. No atrial level shunt by color flow   imaging. No intracardiac mass or thrombus, but the left atrial appendage cannot be visualized adequately with transthoracic echo to exclude thrombus in this location. No  pericardial effusion.       (+)  hypertension- -   -  - -                                      METS/Exercise Tolerance:     Hematologic:       Musculoskeletal:       GI/Hepatic:     (+) GERD,                   Renal/Genitourinary:       Endo:     (+)               Obesity (BMI 38),       Psychiatric/Substance Use:     (+) psychiatric history anxiety       Infectious Disease:       Malignancy:       Other:      (+)  , H/O Chronic Pain,         Physical Exam    Airway        Mallampati: II   TM distance: > 3 FB   Neck ROM: full   Mouth opening: > 3 cm    Respiratory Devices and Support         Dental           Cardiovascular   cardiovascular exam normal          Pulmonary   pulmonary exam normal                OUTSIDE LABS:  CBC:   Lab Results   Component Value Date    HGB 11.3 (L) 02/27/2024     02/26/2024     BMP:   Lab Results   Component Value Date    CR 0.63  "02/26/2024     (H) 10/21/2024     (H) 02/27/2024     COAGS: No results found for: \"PTT\", \"INR\", \"FIBR\"  POC: No results found for: \"BGM\", \"HCG\", \"HCGS\"  HEPATIC: No results found for: \"ALBUMIN\", \"PROTTOTAL\", \"ALT\", \"AST\", \"GGT\", \"ALKPHOS\", \"BILITOTAL\", \"BILIDIRECT\", \"CARLIE\"  OTHER: No results found for: \"PH\", \"LACT\", \"A1C\", \"KAYLA\", \"PHOS\", \"MAG\", \"LIPASE\", \"AMYLASE\", \"TSH\", \"T4\", \"T3\", \"CRP\", \"SED\"    Anesthesia Plan    ASA Status:  2    NPO Status:  NPO Appropriate    Anesthesia Type: General.     - Airway: LMA   Induction: Intravenous.   Maintenance: Balanced.        Consents    Anesthesia Plan(s) and associated risks, benefits, and realistic alternatives discussed. Questions answered and patient/representative(s) expressed understanding.     - Discussed:     - Discussed with:  Patient            Postoperative Care    Pain management: IV analgesics, Oral pain medications, Peripheral nerve block (Single Shot).   PONV prophylaxis: Ondansetron (or other 5HT-3), Dexamethasone or Solumedrol     Comments:    Other Comments: Adductor canal block for postop pain as requested by surgeon           Kala Sanchez MD    I have reviewed the pertinent notes and labs in the chart from the past 30 days and (re)examined the patient.  Any updates or changes from those notes are reflected in this note.                       # Obesity: Estimated body mass index is 37.91 kg/m  as calculated from the following:    Height as of this encounter: 1.576 m (5' 2.05\").    Weight as of this encounter: 94.2 kg (207 lb 9.6 oz).             "

## 2024-10-21 NOTE — ANESTHESIA POSTPROCEDURE EVALUATION
Patient: Conor Kapadia    Procedure: Procedure(s):  Revision left total knee arthroplasty       Anesthesia Type:  General    Note:  Disposition: Inpatient   Postop Pain Control: Uneventful            Sign Out: Well controlled pain   PONV: No   Neuro/Psych: Uneventful            Sign Out: Acceptable/Baseline neuro status   Airway/Respiratory: Uneventful            Sign Out: Acceptable/Baseline resp. status   CV/Hemodynamics: Uneventful            Sign Out: Acceptable CV status; No obvious hypovolemia; No obvious fluid overload   Other NRE:    DID A NON-ROUTINE EVENT OCCUR? No           Last vitals:  Vitals Value Taken Time   /75 10/21/24 1200   Temp 97.5  F (36.4  C) 10/21/24 1130   Pulse 72 10/21/24 1201   Resp 29 10/21/24 1154   SpO2 96 % 10/21/24 1201   Vitals shown include unfiled device data.    Electronically Signed By: Kala Sanchez MD  October 21, 2024  12:03 PM

## 2024-10-21 NOTE — OP NOTE
Conor Kapadia  October 21, 2024  [facility]  PREOPERATIVE DIAGNOSIS: Failed total knee arthroplasty, left knee.  POSTOPERATIVE DIAGNOSIS: Failed total knee arthroplasty, left knee.  PROCEDURE: Left revision total knee arthroplasty using Sundar persona revision total knee with a size 5 femoral component and size 11 x 135 mm femoral stem, a D tibial baseplate with a 11 x 135 mm offset tibial stem, a 14 mm LCCK tibial insert   SURGEON: Moe Arroyo MD.  FIRST ASSISTANT: Nancy Jessica PA-C, who provided retraction, positioning and was crucial throughout the entire case.  ANESTHESIA: Femoral nerve block followed by general anesthetic.  ESTIMATED BLOOD LOSS: 50 cc.  Implants:   Implant Name Type Inv. Item Serial No.  Lot No. LRB No. Used Action   BONE CEMENT SIMPLEX W/TOBRAMYCIN 6197-9-001 - [id number] Cement, Bone BONE CEMENT SIMPLEX W/TOBRAMYCIN 6197-9-001  [employer] [id number] Left 2 Implanted   PERSONA REVISION STEM EXTENSION STRAIGHT SPLINED 11MM DIAMETER +135MM LENGTH    SUNDAR [id number] Left 1 Implanted   CMPNT FEM 5 STD KN CNDRL CMNT CNSTRN PERSONA COCR STRL LF - [id number] Total Joint Component/Insert CMPNT FEM 5 STD KN CNDRL CMNT CNSTRN PERSONA COCR STRL LF  [employer] [id number] Left 1 Implanted   STEM + MM 11MM PERSONA 3MM OFST KN STRL LF - [id number] Total Joint Component/Insert STEM + MM 11MM PERSONA 3MM OFST KN STRL LF  [employer] [id number] Left 1 Implanted   CMPNT TIB PERSONA D KN LT NPOR REV FX STRL LF - [id number] Total Joint Component/Insert CMPNT TIB PERSONA D KN LT NPOR REV FX STRL LF  [employer] [id number] Left 1 Implanted   PERSONA REVISION, Articular Surface, Fixed bearing, CCK, Left, 14mm Height    SUNDAR [id number] Left 1 Implanted     PREOPERATIVE ANTIBIOTIC PROPHYLAXIS: 3 gram IV ancef  With 1.95 gram of ORAL TXA PRIOR TO THE start.  POSTOPERATIVE DVT PROPHYLAXIS:  mg po BID.  INDICATION: Conor Phillip had a previous total knee  arthroplasty.  Postoperatively from this she had done well.  Over the last number of years the patient's had increasing pain,swelling, and she never had very good flexion.  Her flexion was always limited to about 85 degrees.  X-rays show a well-fixed left total knee arthroplasty.  Laboratories and aspiration are negative for infection.  We discussed treatment options.  At this point the patient is failing conservative management of this painful total knee arthroplasty.  We discussed revision total knee arthroplasty.  The patient understands the risk benefits and alternatives and wished to proceed with surgery.    OPERATIVE REPORT: The patient was taken to the preoperative area, where an adductor canal block was placed in the left lower extremity by the anesthesiologist. They were then taken to the operating room, and a general anesthetic was obtained. Intravenous antibiotic prophylaxis was then given as listed above 30 minutes prior to inflating the tourniquet. The left knee was confirmed as the operative knee. It was prepped and draped in the usual fashion. Timeout was performed, confirming the left knee as the operative knee, and then it was elevated and exsanguinated. Tourniquet was itifiated to 300 mmHg.  I approached the knee through the previous incision. Full-thickness skin flaps were created. A medial parapatellar arthrotomy was performed. I performed a mild synovectomy. The patella was everted, and the patellar fat pad was excised.  I everted the patella laterally, flexed the knee up, removed the polyethylene insert.  We did send 3 cultures, 2 of tissue and 1 of fluid.  There was no obvious evidence of infection.  At this point we inspected the implants, and gently removed the tibia and the femoral components from the bone.  Once this was done I removed all the excess cement.    We then thoroughly irrigated the knee and prepared the bones for the revision implants.  We started with the tibia.  Here I reamed  this up to a size 11 x 135 to fit the tibial stem.  Once this was done we resected the proximal tibia perpendicular to the long axis of the tibia using the intramedullary guide.  We then sized the proximal tibia, and it sized to fit a size D tibial implant.  We then built the trial tibial implant and placed this into position.  We realized we needed it and 3 degree offset tibial stem.  We then made this trial implant with a 11 x 135 3 degree offset tibial stem with a size D tibial implant.  We then paid our attention to the femur.  We reamed the femoral canal up to fit a size 11 x 135 femoral stem.  Once this was done we then did a minimal distal femoral resection and sized the distal femur.  The distal femur sized to fit a size 5 implant.  We made our anterior posterior and chamfer resections.  We then placed our trial femoral implant into position.  With our tibial and femoral trials in place we found that a size 14 mm thick polyethylene insert gave us the best stability and full range of motion.  The patella tracked nicely.  At this point we then thoroughly irrigated the knee and prepared to cemented our final components.     Using 2 batches of simplex antibiotic cement we cemented in a size 5 femur with a size 11 x 135 straight femoral stem, a size D tibia with a 11 x 135 offset tibial stem.  Once the cement had cured,  any excess cement was removed. I then trialed our polyethylene inserts and found that the 14 mm LCCK polyethylene insert gave us the best stability and full range of motion.   The real 14 mm LCCK insert was then impacted.  The knee tracked perfectly and was well balanced.  I PLACED ONE GRAM OF VANCOMYCIN POWDER IN THE WOUND.  The arthrotomy was then closed using interrupted 0 Vicryl plus sutures, 1 Stratafix plus suture and the skin was closed with absorbable sutures and staples in the skin.  An aquacel dressing was placed over the wound.  Of note, on the operating table the knee range of motion  was 0-140.  The knee was placed in a soft compressive dressing. They were awoken and transferred to the Recovery Room in stable condition.  The postoperative plan will be to admit the patient to the hospital overnight. They can weightbear as tolerated. There were no noted complications. Sponge and needle counts were correct.

## 2024-10-21 NOTE — OR NURSING
Femoral component, tibial component, and poly insert explanted (from patient's left knee) by Dr. Arroyo at this time for the purpose of revision. Explanted hardware disposed of per hospital policy.    Giovani Bright RN on 10/21/2024 at 8:54 AM

## 2024-10-21 NOTE — CONSULTS
[facility]  Consult Note - Hospitalist Service  Date of Admission:  10/21/2024  Primary Care Physician   Paola Mcmahon  Consult Requested by: Dr Dina yuen  Reason for Consult: medical issues, hypertension     Assessment & Plan   Conor Kapadia is a 74 year old female who has a past medical history of a previous left total knee arthroplasty with hypertension, hyperlipidemia, remote migraines, fibromyalgia, spinal stenosis, IBS, history of herpes who presented for elective left total knee arthroplasty revision.  Patient was seen on postop day 0 in bed currently biggest concern right now is her ongoing nausea.  She does have pain but her nausea is the bigger issue.  She has no chest pain, shortness of breath, fever, chills, sweats.  Her goal is to return home with her  at the time of discharge.  There she has 4-5 stairs to get into the home but it is 1 level living after that.  Patient had multiple surgeries before.    Left total knee arthroplasty revision  Patient had a total knee arthroplasty revision on 10/21/2024 per Ortho.  Pain is to be controlled with Tylenol, Celebrex, oxycodone, Atarax, and Dilaudid as needed.  Patient has a history of being on Neurontin at home but did not tolerate this.  She will have a repeat hemoglobin in the morning.  PT and OT will evaluate her seeing if she requires a TCU versus going home like she wishes.  Patient lives in a home with her  and has 4 stairs to contend with to get in to the home.    Hypertension   Patient will continue on her home Lopressor.  Patient's blood pressure is up little bit in the setting of pain.  Parameters have been written    hyperlipidemia  Patient is on low-dose Crestor which we will continue    History of spinal stenosis, fibromyalgia, migraines  Patient recently had been on Neurontin for her pain but did not tolerate this.  She is on no medications for any of these currently.  She has a remote history of  "migraines in the past    Morbid obesity  BMI is 38 which complicates all aspects of her care.    History of herpes  Patient uses Valtrex at home as needed.                           # Obesity: Estimated body mass index is 37.91 kg/m  as calculated from the following:    Height as of this encounter: 1.576 m (5' 2.05\").    Weight as of this encounter: 94.2 kg (207 lb 9.6 oz).                 Discussed plan of care with son bedside  Diet: Advance Diet as Tolerated: Regular Diet Adult    DVT Prophylaxis: Enoxaparin (Lovenox) SQ and Pneumatic Compression Devices  Espinoza Catheter: Not present  Lines: None     Cardiac Monitoring: None    RESTRAINTS: Not indicated  Code Status: Full Code        I thank Dr Arroyo for the opportunity to participate in the care of your patient.  I will continue to follow along with you.          Barrier to discharge: Pain control, nausea control, safe disposition  Medically Ready for Discharge: Anticipated in 2-4 Days         This document was created using voice recognition technology.  Please excuse any typographical errors that may have occurred.  Please call with any questions.         Spencer You MD  Hospitalist Service  Securely message with [employer] (more info)  Text page via [facility] Paging/Directory   ______________________________________________________________________    Chief Complaint   Left total knee arthroplasty revision    History is obtained from the patient  Epic reviewed, son bedside giving history as well    History of Present Illness   Conor Kapadia is a 74 year old female who has a past medical history of a previous left total knee arthroplasty with hypertension, hyperlipidemia, remote migraines, fibromyalgia, spinal stenosis, IBS, history of herpes who presented for elective left total knee arthroplasty revision.  Patient was seen on postop day 0 in bed currently biggest concern right now is her ongoing nausea.  She does have pain but her nausea is the bigger issue. "  She has no chest pain, shortness of breath, fever, chills, sweats.  Her goal is to return home with her  at the time of discharge.  There she has 4-5 stairs to get into the home but it is 1 level living after that.  Patient had multiple surgeries before.      Past Medical History    Past Medical History:   Diagnosis Date    Anxiety     Arrhythmia 2011    Fibromyalgia     Gastroesophageal reflux disease     Herpes simplex     Takes valtrex as needed    Hyperlipidemia     Hypertension        Past Surgical History   Past Surgical History:   Procedure Laterality Date    ABDOMINAL HYSTERECTOMY  1998    ARTHROPLASTY KNEE Right 02/26/2024    Procedure: Right total knee arthroplasty using Arthrex ibalance total knee with a size 5  posterior stabilized femoral component, a 4 universal tibial baseplate, a 8 mm posterior stabilized tibial insert and a 27 symmetric round patellar button.;  Surgeon: Dex Arroyo MD;  Location: [facility] OR    ARTHROSCOPY KNEE  2007    WITH MEDIAL AND LATERAL MENISCECTOMY    CHONDRECTOMY OF SEMILUNAR CARTILAGE OF KNEE  2008    GENITOURINARY SURGERY Left 2021    URETEROSCOPY STONE EXTRACTION    REPLACEMENT TOTAL KNEE Left 2015    RETINAL LASER PROCEDURE      SALPINGO-OOPHORECTOMY BILATERAL         Medications   Medications Prior to Admission   Medication Sig Dispense Refill Last Dose    acetaminophen (TYLENOL) 325 MG tablet Take 2 tablets (650 mg) by mouth every 4 hours as needed for other (mild pain) 100 tablet 0 10/20/2024    cholecalciferol (VITAMIN D3) 125 mcg (5000 units) capsule Take 125 mcg by mouth daily   Past Week    cyanocobalamin (VITAMIN B-12) 1000 MCG tablet Take 1,000 mcg by mouth daily   Past Week    ketoconazole (NIZORAL) 2 % external cream Apply 1 Application topically daily as needed   Past Week    l-lysine HCl 500 MG TABS tablet Take 500 mg by mouth daily   Past Week    melatonin 3 MG tablet Take 3 mg by mouth nightly as needed for sleep   10/20/2024    metoprolol  tartrate (LOPRESSOR) 50 MG tablet Take 1 tablet by mouth 2 times daily   10/21/2024    mometasone (ELOCON) 0.1 % external cream Apply topically daily as needed   Past Week    Omega-3 Fatty Acids (FISH OIL) 1200 MG capsule Take 1,200 mg by mouth daily   Past Week    Probiotic Product (MISC INTESTINAL DELGADO REGULAT) CAPS Take 1 capsule by mouth daily   10/20/2024    rosuvastatin (CRESTOR) 5 MG tablet Take 5 mg by mouth three times a week.   Past Week    valACYclovir (VALTREX) 500 MG tablet Take 500 mg by mouth 2 times daily as needed.   Past Month          Allergies   Allergies   Allergen Reactions    Acetazolamide Other (See Comments)     Diamox, swelling of tongue        Physical Exam   Vital Signs: Temp: 97.1  F (36.2  C) Temp src: Temporal BP: (!) 147/64 Pulse: 75   Resp: 10 SpO2: 95 % O2 Device: Nasal cannula Oxygen Delivery: 2 LPM  Weight: 207 lbs 9.6 oz    General appearance: Patient is alert and oriented x3, no apparent distress, pleasant and conversing normally, speaking in full sentences, appears stated age  HEENT:   Mucous membranes are moist  RESPIRATORY: Clear to auscultation bilateral, good air movement  CARDIOVASCULAR: Regular rate and rhythm, normal S1/S2, no murmurs  GASTROINTESTINAL: Obese, non-distended, non-tender, soft, bowel sounds present throughout  NEUROLOGIC:  Cranial nerves II-XII intact, without any focal deficits, strength 5/5 throughout  EXTREMITIES:  Moves all extremities, no clubbing, cyanosis, nor edema, left knee dressing in place        Data           Imaging:   Results for orders placed or performed during the hospital encounter of 10/21/24   XR Knee Port Left 1/2 Views    Narrative    LEFT KNEE PORTABLE ONE TO TWO VIEWS   10/21/2024 11:34 AM     HISTORY:  Postop total knee.    COMPARISON: None.      Impression    IMPRESSION: Total knee arthroplasty. The femoral component was only  partially included in the field-of-view of the frontal projection. No  complication or periprosthetic  fracture evident.    LUIS LEON MD         SYSTEM ID:  [id number]     Procedures: Left total knee arthroplasty revision 10/21/2024  I have personally have reviewed the patient's most up to date radiologic exams,   labs, orders, and medications myself

## 2024-10-21 NOTE — ANESTHESIA PROCEDURE NOTES
"Adductor canal Procedure Note    Pre-Procedure   Staff -        Anesthesiologist:  Kala Sanchez MD       Performed By: anesthesiologist       Location: OR       Procedure Start/Stop Times: 10/21/2024 7:17 AM and 10/21/2024 7:20 AM       Pre-Anesthestic Checklist: patient identified, IV checked, site marked, risks and benefits discussed, informed consent, monitors and equipment checked, pre-op evaluation, at physician/surgeon's request and post-op pain management  Timeout:       Correct Patient: Yes        Correct Procedure: Yes        Correct Site: Yes        Correct Position: Yes        Correct Laterality: Yes        Site Marked: Yes  Procedure Documentation  Procedure: Adductor canal       Laterality: left       Patient Position: supine       Skin prep: Betadine       Needle Type: short bevel       Needle Gauge: 21.        Needle Length (Inches): 4        Ultrasound guided       1. Ultrasound was used to identify targeted nerve, plexus, vascular marker, or fascial plane and place a needle adjacent to it in real-time.       2. Ultrasound was used to visualize the spread of anesthetic in close proximity to the above referenced structure.       4. The visualized anatomic structures appeared normal.       5. There were no apparent abnormal pathologic findings.    Assessment/Narrative         The placement was negative for: blood aspirated, painful injection and site bleeding       Paresthesias: No.       Bolus given via needle. no blood aspirated via catheter.        Secured via.        Insertion/Infusion Method: Single Shot       Complications: none       Injection made incrementally with aspirations every 5 mL.    Medication(s) Administered   Bupivacaine 0.25% w/ 1:200K Epi (Injection) - Injection   20 mL - 10/21/2024 7:17:00 AM  Medication Administration Time: 10/21/2024 7:17 AM      FOR [facility] ([facility] [place]) ONLY:   Pain Team Contact information: please page the Pain Team Via [employer]. Search \"Pain\". During " daytime hours, please page the attending first. At night please page the resident first.

## 2024-10-21 NOTE — ANESTHESIA CARE TRANSFER NOTE
Patient: Conor Kapadia    Procedure: Procedure(s):  Revision left total knee arthroplasty       Diagnosis: Mechanical loosening of internal left knee prosthetic joint (H) [T84.033A]  Diagnosis Additional Information: No value filed.    Anesthesia Type:   General     Note:    Oropharynx: oropharynx clear of all foreign objects  Level of Consciousness: drowsy  Oxygen Supplementation: face mask  Level of Supplemental Oxygen (L/min / FiO2): 6  Independent Airway: airway patency satisfactory and stable  Dentition: dentition unchanged  Vital Signs Stable: post-procedure vital signs reviewed and stable  Report to RN Given: handoff report given  Patient transferred to: PACU    Handoff Report: Identifed the Patient, Identified the Reponsible Provider, Reviewed the pertinent medical history, Discussed the surgical course, Reviewed Intra-OP anesthesia mangement and issues during anesthesia, Set expectations for post-procedure period and Allowed opportunity for questions and acknowledgement of understanding      Vitals:  Vitals Value Taken Time   /75 10/21/24 1043   Temp     Pulse 80 10/21/24 1045   Resp 17 10/21/24 1045   SpO2 97 % 10/21/24 1045   Vitals shown include unfiled device data.    Electronically Signed By: PIPO Brown CRNA  October 21, 2024  10:46 AM

## 2024-10-21 NOTE — PROGRESS NOTES
10/21/24 8741   Appointment Info   Signing Clinician's Name / Credentials (PT) Ivory Jimenez DPT   Living Environment   People in Home spouse   Current Living Arrangements house   Home Accessibility stairs to enter home   Number of Stairs, Main Entrance 4   Stair Railings, Main Entrance railings on both sides of stairs   Number of Stairs, Within Home, Primary none   Transportation Anticipated family or friend will provide   Living Environment Comments Pt reports that her spouse CANNOT help physically at discharge. She will need to be Noni with ADLs and mobility. Pt notes that her adult son may be available to assist some the first 24-48 hours at discharge.   Self-Care   Usual Activity Tolerance good   Current Activity Tolerance fair   Regular Exercise No   Equipment Currently Used at Home none   Fall history within last six months no   Activity/Exercise/Self-Care Comment Pt has a walker, cane, toilet frame, and shower chair for her walk in shower at home. Was not using any of this equipment regularly prior to surgery.   General Information   Onset of Illness/Injury or Date of Surgery 10/21/24   Referring Physician Dex Arroyo MD   Patient/Family Therapy Goals Statement (PT) Return home at discharge   Pertinent History of Current Problem (include personal factors and/or comorbidities that impact the POC) Pt is POD0 L TKA revision. Pt has had R TKA in the past as well.   Existing Precautions/Restrictions fall   Weight-Bearing Status - LLE weight-bearing as tolerated   Cognition   Affect/Mental Status (Cognition) WNL   Orientation Status (Cognition) oriented x 4   Follows Commands (Cognition) WNL   Pain Assessment   Patient Currently in Pain Yes, see Vital Sign flowsheet   Integumentary/Edema   Integumentary/Edema Comments Ace wrap in place to L LE   Posture    Posture Forward head position;Protracted shoulders   Range of Motion (ROM)   ROM Comment L knee flexion to 30 degrees, appears to have  terminal knee extension   Strength (Manual Muscle Testing)   Strength Comments Able to SLR L LE with effort   Bed Mobility   Comment, (Bed Mobility) sit<>supine with Ninfa at L LE   Transfers   Comment, (Transfers) sit<>stand with Ninfa   Gait/Stairs (Locomotion)   Distance in Feet (Gait) 5'   Comment, (Gait/Stairs) Ninfa with FWW   Balance   Balance Comments Requires B UE support for safe dynamic mobility   Sensory Examination   Sensory Perception patient reports no sensory changes   Coordination   Coordination no deficits were identified   Muscle Tone   Muscle Tone no deficits were identified   Clinical Impression   Criteria for Skilled Therapeutic Intervention Yes, treatment indicated   PT Diagnosis (PT) Impaired functional mobility   Influenced by the following impairments Pain, weakness, nausea/lightheadedness, impaired ROM   Functional limitations due to impairments Difficulty with bed mobility, transfers, ambulation, stairs   Clinical Presentation (PT Evaluation Complexity) stable   Clinical Presentation Rationale medically progressing, clear POC   Clinical Decision Making (Complexity) low complexity   Planned Therapy Interventions (PT) balance training;bed mobility training;gait training;home exercise program;patient/family education;ROM (range of motion);stair training;strengthening;stretching;transfer training;progressive activity/exercise;home program guidelines   Risk & Benefits of therapy have been explained evaluation/treatment results reviewed;care plan/treatment goals reviewed;risks/benefits reviewed;current/potential barriers reviewed;participants voiced agreement with care plan;participants included;patient   PT Total Evaluation Time   PT Eval, Low Complexity Minutes (52288) 10   Physical Therapy Goals   PT Frequency Daily   PT Predicted Duration/Target Date for Goal Attainment 10/22/24   PT Goals Bed Mobility;Transfers;Gait;Stairs   PT: Bed Mobility Modified independent;Supine to/from sit   PT:  Transfers Modified independent;Sit to/from stand   PT: Gait Modified independent;Rolling walker;150 feet   PT: Stairs Supervision/stand-by assist;4 stairs;Rail on both sides   PT Discharge Planning   PT Discharge Recommendation (DC Rec)   (Per ortho MD)   PT Rationale for DC Rec Anticipate that with continued IP PT the patient will be Noni for bed mobility, transfers, and ambulation with a FWW. Pt may require SBA for safe stair management the first 24-48 hours after discharge.   PT Brief overview of current status Ax1 WW; limited by nausea/lightheadedness   Total Session Time   Total Session Time (sum of timed and untimed services) 10

## 2024-10-21 NOTE — PLAN OF CARE
"Arrived to room 620 from PACU at 1330 via cart, transferred to bed via hover mat without difficulty, oriented to room and call system.    Patient vital signs are at baseline: Yes  Patient able to ambulate as they were prior to admission or with assist devices provided by therapies during their stay:  Yes-Ax1 using gait belt, and walker.  Patient MUST void prior to discharge:  Yes  Patient able to tolerate oral intake:  Yes-IV compazine given for nausea w/ improvement.  Pain has adequate pain control using Oral analgesics:  Yes-PO oxycodone and scheduled tylenol.  Does patient have an identified :  Yes-son & spouse  Has goal D/C date and time been discussed with patient:  Yes - home tomorrow.    Pt A&O x4. CMS intact. Dressing CDI.     Problem: Adult Inpatient Plan of Care  Goal: Plan of Care Review  Description: The Plan of Care Review/Shift note should be completed every shift.  The Outcome Evaluation is a brief statement about your assessment that the patient is improving, declining, or no change.  This information will be displayed automatically on your shift  note.  Outcome: Progressing  Flowsheets (Taken 10/21/2024 1372)  Plan of Care Reviewed With:   patient   child  Overall Patient Progress: improving  Goal: Patient-Specific Goal (Individualized)  Description: You can add care plan individualizations to a care plan. Examples of Individualization might be:  \"Parent requests to be called daily at 9am for status\", \"I have a hard time hearing out of my right ear\", or \"Do not touch me to wake me up as it startles  me\".  Outcome: Progressing  Goal: Absence of Hospital-Acquired Illness or Injury  Outcome: Progressing  Intervention: Identify and Manage Fall Risk  Recent Flowsheet Documentation  Taken 10/21/2024 1347 by Olinda Hewitt, RN  Safety Promotion/Fall Prevention:   activity supervised   assistive device/personal items within reach   nonskid shoes/slippers when out of bed   safety round/check " Name: Gustavo Palmer    MRN#: [id number]    Reason for Hospitalization: Lactic acid acidosis [E87.2]  COPD exacerbation (H) [J44.1]  Atrial fibrillation with RVR (H) [I48.91]    Discharge Date: 4/5/2019    Patient / Family response to discharge plan: [facility].  [facility] van transport at 1030.    Other Providers (Care Coordinator, County Services, PCA services etc): No    CTS Hand Off Completed: Yes: going to TCU    SIRENA Score: Average     Future Appointments:   Future Appointments   Date Time Provider Department Center   4/12/2019 10:40 AM Madi Lemos DO [facility]   5/7/2019 10:00 AM Brice Jama MD [facility]       Discharge Disposition: transitional care unit    Discharge Planner   Discharge Plans in progress: Completed. [facility].    Barriers to discharge plan: None   Follow up plan: per TCU        Entered by: CHRISTA HUERTA 04/05/2019 11:08 AM      completed  Intervention: Prevent and Manage VTE (Venous Thromboembolism) Risk  Recent Flowsheet Documentation  Taken 10/21/2024 1347 by Olinda Hewitt RN  VTE Prevention/Management: SCDs on (sequential compression devices)  Intervention: Prevent Infection  Recent Flowsheet Documentation  Taken 10/21/2024 1347 by Olinda Hewitt RN  Infection Prevention: rest/sleep promoted  Goal: Optimal Comfort and Wellbeing  Outcome: Progressing  Intervention: Monitor Pain and Promote Comfort  Recent Flowsheet Documentation  Taken 10/21/2024 1538 by Olinda Hewitt RN  Pain Management Interventions: medication (see MAR)  Taken 10/21/2024 1443 by Olinda Hewitt RN  Pain Management Interventions: medication (see MAR)  Taken 10/21/2024 1332 by Olinda Hewitt RN  Pain Management Interventions: cold applied  Goal: Readiness for Transition of Care  Outcome: Progressing     Problem: Knee Arthroplasty  Goal: Optimal Coping  Outcome: Progressing  Goal: Absence of Bleeding  Outcome: Progressing  Intervention: Monitor and Manage Bleeding  Recent Flowsheet Documentation  Taken 10/21/2024 1347 by Olinda Hewitt RN  Bleeding Management: dressing monitored  Goal: Effective Bowel Elimination  Outcome: Progressing  Goal: Fluid and Electrolyte Balance  Outcome: Progressing  Intervention: Monitor and Manage Fluid and Electrolyte Balance  Recent Flowsheet Documentation  Taken 10/21/2024 1347 by Olinda Hewitt RN  Fluid/Electrolyte Management: fluids provided  Goal: Optimal Functional Ability  Outcome: Progressing  Intervention: Promote Optimal Functional Status  Recent Flowsheet Documentation  Taken 10/21/2024 1804 by Olinda Hewitt RN  Assistive Device Utilized:   gait belt   walker  Activity Management: ambulated to bathroom  Goal: Absence of Infection Signs and Symptoms  Outcome: Progressing  Goal: Intact Neurovascular Status  Outcome: Progressing  Goal: Anesthesia/Sedation Recovery  Outcome: Progressing  Intervention:  Optimize Anesthesia Recovery  Recent Flowsheet Documentation  Taken 10/21/2024 1347 by Olinda Hewitt RN  Safety Promotion/Fall Prevention:   activity supervised   assistive device/personal items within reach   nonskid shoes/slippers when out of bed   safety round/check completed  Goal: Optimal Pain Control and Function  Outcome: Progressing  Intervention: Prevent or Manage Pain  Recent Flowsheet Documentation  Taken 10/21/2024 1538 by Olinda Hewitt RN  Pain Management Interventions: medication (see MAR)  Taken 10/21/2024 1443 by Olinda Hewitt RN  Pain Management Interventions: medication (see MAR)  Taken 10/21/2024 1332 by Olinda Hewitt RN  Pain Management Interventions: cold applied  Goal: Nausea and Vomiting Relief  Outcome: Progressing  Intervention: Prevent or Manage Nausea and Vomiting  Recent Flowsheet Documentation  Taken 10/21/2024 1347 by Olinda Hewitt RN  Nausea/Vomiting Interventions: antiemetic  Goal: Effective Urinary Elimination  Outcome: Progressing  Goal: Effective Oxygenation and Ventilation  Outcome: Progressing   Goal Outcome Evaluation:      Plan of Care Reviewed With: patient, child    Overall Patient Progress: improvingOverall Patient Progress: improving

## 2024-10-21 NOTE — ANESTHESIA PROCEDURE NOTES
Airway       Patient location during procedure: OR  Staff -        CRNA: Maggie Raymond APRN CRNA       Performed By: CRNAIndications and Patient Condition       Indications for airway management: akash-procedural         Mask difficulty assessment: 1 - vent by mask    Final Airway Details       Final airway type: supraglottic airway    Supraglottic Airway Details        Type: LMA       Brand: I-Gel       LMA size: 4    Post intubation assessment        Placement verified by: capnometry and chest rise        Number of attempts at approach: 1       Number of other approaches attempted: 0       Secured with: commercial tube kohler       Ease of procedure: easy       Dentition: Intact and Unchanged

## 2024-10-22 ENCOUNTER — APPOINTMENT (OUTPATIENT)
Dept: PHYSICAL THERAPY | Facility: CLINIC | Age: 74
DRG: 468 | End: 2024-10-22
Attending: ORTHOPAEDIC SURGERY
Payer: COMMERCIAL

## 2024-10-22 VITALS
RESPIRATION RATE: 16 BRPM | HEART RATE: 81 BPM | SYSTOLIC BLOOD PRESSURE: 116 MMHG | BODY MASS INDEX: 38.2 KG/M2 | HEIGHT: 62 IN | WEIGHT: 207.6 LBS | OXYGEN SATURATION: 98 % | DIASTOLIC BLOOD PRESSURE: 53 MMHG | TEMPERATURE: 97.8 F

## 2024-10-22 LAB
GLUCOSE SERPL-MCNC: 109 MG/DL (ref 70–99)
HGB BLD-MCNC: 10.8 G/DL (ref 11.7–15.7)
PLATELET # BLD AUTO: 261 10E3/UL (ref 150–450)

## 2024-10-22 PROCEDURE — 97116 GAIT TRAINING THERAPY: CPT | Mod: GP | Performed by: PHYSICAL THERAPIST

## 2024-10-22 PROCEDURE — 85049 AUTOMATED PLATELET COUNT: CPT | Performed by: ORTHOPAEDIC SURGERY

## 2024-10-22 PROCEDURE — 82947 ASSAY GLUCOSE BLOOD QUANT: CPT | Performed by: INTERNAL MEDICINE

## 2024-10-22 PROCEDURE — 85018 HEMOGLOBIN: CPT | Performed by: ORTHOPAEDIC SURGERY

## 2024-10-22 PROCEDURE — 97530 THERAPEUTIC ACTIVITIES: CPT | Mod: GP | Performed by: PHYSICAL THERAPIST

## 2024-10-22 PROCEDURE — 999N000111 HC STATISTIC OT IP EVAL DEFER

## 2024-10-22 PROCEDURE — 250N000013 HC RX MED GY IP 250 OP 250 PS 637: Performed by: ORTHOPAEDIC SURGERY

## 2024-10-22 PROCEDURE — 250N000013 HC RX MED GY IP 250 OP 250 PS 637: Performed by: INTERNAL MEDICINE

## 2024-10-22 PROCEDURE — 250N000011 HC RX IP 250 OP 636: Performed by: ORTHOPAEDIC SURGERY

## 2024-10-22 PROCEDURE — 99232 SBSQ HOSP IP/OBS MODERATE 35: CPT | Performed by: INTERNAL MEDICINE

## 2024-10-22 PROCEDURE — 97110 THERAPEUTIC EXERCISES: CPT | Mod: GP | Performed by: PHYSICAL THERAPIST

## 2024-10-22 PROCEDURE — 36415 COLL VENOUS BLD VENIPUNCTURE: CPT | Performed by: ORTHOPAEDIC SURGERY

## 2024-10-22 RX ADMIN — CYANOCOBALAMIN TAB 1000 MCG 1000 MCG: 1000 TAB at 07:53

## 2024-10-22 RX ADMIN — METOPROLOL TARTRATE 50 MG: 50 TABLET, FILM COATED ORAL at 07:54

## 2024-10-22 RX ADMIN — Medication 1 CAPSULE: at 07:53

## 2024-10-22 RX ADMIN — POLYETHYLENE GLYCOL 3350 17 G: 17 POWDER, FOR SOLUTION ORAL at 07:54

## 2024-10-22 RX ADMIN — OXYCODONE HYDROCHLORIDE 5 MG: 5 TABLET ORAL at 06:56

## 2024-10-22 RX ADMIN — ACETAMINOPHEN 325MG 975 MG: 325 TABLET ORAL at 05:24

## 2024-10-22 RX ADMIN — Medication 125 MCG: at 07:54

## 2024-10-22 RX ADMIN — SENNOSIDES AND DOCUSATE SODIUM 1 TABLET: 8.6; 5 TABLET ORAL at 07:54

## 2024-10-22 RX ADMIN — CELECOXIB 100 MG: 100 CAPSULE ORAL at 07:54

## 2024-10-22 RX ADMIN — ENOXAPARIN SODIUM 40 MG: 40 INJECTION SUBCUTANEOUS at 06:56

## 2024-10-22 ASSESSMENT — ACTIVITIES OF DAILY LIVING (ADL)
ADLS_ACUITY_SCORE: 26
ADLS_ACUITY_SCORE: 28
ADLS_ACUITY_SCORE: 26
ADLS_ACUITY_SCORE: 26

## 2024-10-22 NOTE — PLAN OF CARE
"Patient vital signs are at baseline: Yes  Patient able to ambulate as they were prior to admission or with assist devices provided by therapies during their stay:  Yes-SBA using walker and gait belt.  Patient MUST void prior to discharge:  Yes  Patient able to tolerate oral intake:  Yes-regular diet.  Pain has adequate pain control using Oral analgesics:  Yes-PO oxycodone and scheduled tylenol.  Does patient have an identified :  Yes-spouse & son  Has goal D/C date and time been discussed with patient:  Yes     Pt A&O x4. CMS intact. Dressing CDI-ace wrap removed.     Reviewed discharge instructions with patient. Questions answered. Patient discharged to home via son with filled medications, discharge instructions, and belongings.    Problem: Adult Inpatient Plan of Care  Goal: Plan of Care Review  Description: The Plan of Care Review/Shift note should be completed every shift.  The Outcome Evaluation is a brief statement about your assessment that the patient is improving, declining, or no change.  This information will be displayed automatically on your shift  note.  Outcome: Met  Goal: Patient-Specific Goal (Individualized)  Description: You can add care plan individualizations to a care plan. Examples of Individualization might be:  \"Parent requests to be called daily at 9am for status\", \"I have a hard time hearing out of my right ear\", or \"Do not touch me to wake me up as it startles  me\".  Outcome: Met  Goal: Absence of Hospital-Acquired Illness or Injury  Outcome: Met  Intervention: Identify and Manage Fall Risk  Recent Flowsheet Documentation  Taken 10/22/2024 0754 by Olinda Hewitt RN  Safety Promotion/Fall Prevention:   nonskid shoes/slippers when out of bed   safety round/check completed  Intervention: Prevent and Manage VTE (Venous Thromboembolism) Risk  Recent Flowsheet Documentation  Taken 10/22/2024 0754 by Olinda Hewitt RN  VTE Prevention/Management: SCDs off (sequential compression " devices)  Intervention: Prevent Infection  Recent Flowsheet Documentation  Taken 10/22/2024 0754 by Olinda Hewitt RN  Infection Prevention: rest/sleep promoted  Goal: Optimal Comfort and Wellbeing  Outcome: Met  Intervention: Monitor Pain and Promote Comfort  Recent Flowsheet Documentation  Taken 10/22/2024 0738 by Olinda Hewitt RN  Pain Management Interventions: declines  Goal: Readiness for Transition of Care  Outcome: Met     Problem: Knee Arthroplasty  Goal: Optimal Coping  Outcome: Met  Goal: Absence of Bleeding  Outcome: Met  Intervention: Monitor and Manage Bleeding  Recent Flowsheet Documentation  Taken 10/22/2024 0754 by Olinda Hewitt RN  Bleeding Management: dressing monitored  Goal: Effective Bowel Elimination  Outcome: Met  Goal: Fluid and Electrolyte Balance  Outcome: Met  Goal: Optimal Functional Ability  Outcome: Met  Intervention: Promote Optimal Functional Status  Recent Flowsheet Documentation  Taken 10/22/2024 0754 by Olinda Hewitt RN  Assistive Device Utilized:   gait belt   walker  Activity Management: ambulated to bathroom  Goal: Absence of Infection Signs and Symptoms  Outcome: Met  Goal: Intact Neurovascular Status  Outcome: Met  Goal: Anesthesia/Sedation Recovery  Outcome: Met  Intervention: Optimize Anesthesia Recovery  Recent Flowsheet Documentation  Taken 10/22/2024 0754 by Olinda Hewitt RN  Safety Promotion/Fall Prevention:   nonskid shoes/slippers when out of bed   safety round/check completed  Administration (IS):   instruction provided, follow-up   self-administered  Goal: Optimal Pain Control and Function  Outcome: Met  Intervention: Prevent or Manage Pain  Recent Flowsheet Documentation  Taken 10/22/2024 0738 by Olinda Hewitt RN  Pain Management Interventions: declines  Goal: Nausea and Vomiting Relief  Outcome: Met  Goal: Effective Urinary Elimination  Outcome: Met  Goal: Effective Oxygenation and Ventilation  Outcome: Met   Goal Outcome Evaluation:       Plan of Care Reviewed With: patient, child    Overall Patient Progress: improvingOverall Patient Progress: improving

## 2024-10-22 NOTE — PROGRESS NOTES
"Orthopedic Surgery  10/22/2024  POD#: 1    S: Patient voices no complaints today. Pain managed well.    O: Blood pressure 111/52, pulse 66, temperature 97.1  F (36.2  C), temperature source Temporal, resp. rate 16, height 1.576 m (5' 2.05\"), weight 94.2 kg (207 lb 9.6 oz), SpO2 97%.  Lab Results   Component Value Date    HGB 11.3 02/27/2024     No results found for: \"INR\"     I/O last 3 completed shifts:  In: 1986 [P.O.:60; I.V.:1926]  Out: 1725 [Urine:1675; Blood:50]    Neurovascularly intact.  Calves are negative bilaterally, both soft and nontender.  The wound is C/D/I. Aquacel intact and dry.  The wound looks good with minimal erythema of the surrounding skin.    A: Ms. Kapadia is doing well status post Procedure(s):  Revision left total knee arthroplasty.    P:   1. Mobilize and continue physical therapy. Outpt PT to begin this week.  2. Anticoagulation - discharge on ASA  3. Pain management - controlled  4. Anticipate discharge to home after therapy this morning.      Nancy Jessica PA-C  O: [phone]  "

## 2024-10-22 NOTE — PROGRESS NOTES
Occupational Therapy: Orders received. Chart reviewed and discussed with care team.? Occupational Therapy not indicated due to pt with no ADL needs. Moving well and screened. Just had other knee done in Feb. .? Defer discharge recommendations to care team.? Will complete orders.

## 2024-10-22 NOTE — PROGRESS NOTES
[facility]    Medicine Progress Note - Hospitalist Service    Date of Admission:  10/21/2024    Primary Care Physician   Paola Mcmahon       Assessment & Plan     Conor Kapadia is a 74 year old female who has a past medical history of a previous left total knee arthroplasty with hypertension, hyperlipidemia, remote migraines, fibromyalgia, spinal stenosis, IBS, history of herpes who presented for elective left total knee arthroplasty revision.        Left total knee arthroplasty revision  Patient had a total knee arthroplasty revision on 10/21/2024 per Ortho.  Pain is to be controlled with Tylenol, Celebrex, oxycodone, Atarax, and Dilaudid as needed.  Patient has a history of being on Neurontin at home but did not tolerate this.  Hemoglobin check is 10.8 and stable postop day 1.  Physical therapy following and her goal is to go home later today.   Patient lives in a home with her  and has 4 stairs to contend with to get in to the home.  Discharge medications were reviewed and appropriate.  Patient safe to return home as long as she is able to do stairs and passes physical therapy.     Hypertension   Patient will continue on her home Lopressor.  Patient's blood pressure was up little bit in the setting of pain.  Parameters have been written.  Pain is better and her blood pressure is down to 111-135/52-69.     hyperlipidemia  Patient is on low-dose Crestor which we will continue     History of spinal stenosis, fibromyalgia, migraines  Patient recently had been on Neurontin for her pain but did not tolerate this.  She is on no medications for any of these currently.  She has a remote history of migraines in the past     Morbid obesity  BMI is 38 which complicates all aspects of her care.     History of herpes  Patient uses Valtrex at home as needed.            Discussed plan of care with nursing, social work/case management, orthopedic notes reviewed      Diet: Advance Diet as  Tolerated: Regular Diet Adult  Diet    DVT Prophylaxis: Per Ortho  Espinoza Catheter: Not present  Lines: None     Cardiac Monitoring: None    RESTRAINTS: Not indicated  Code Status: Full Code         This document was created using voice recognition technology.  Please excuse any typographical errors that may have occurred.  Please call with any questions.     Disposition Plan      Expected Discharge Date: 10/22/2024,  9:00 AM                Barrier to discharge: Pain, physical therapy  Medically Ready for Discharge: Anticipated Today       Spencer You MD  Hospitalist Service  [facility]  Securely message with [employer] (more info)  Text page via [facility] Paging/Directory   ______________________________________________________________________    Interval History   Patient doing better this morning.  Her pain is well-controlled.  Her nausea is basically resolved.  Her blood pressure is down as well.  She has been able to get up with physical therapy and the goal is to go home later today if she continues to do well.  Has no complaints of fever, chills, sweats.      ROS: A comprehensive review of systems was negative except for items noted in the HPI.  Patient currently denies any fever, chills, sweats, nausea, vomiting, diarrhea, shortness of breath, or chest pain.      Physical Exam   Vital Signs: Temp: 97.1  F (36.2  C) Temp src: Temporal BP: 111/52 Pulse: 66   Resp: 16 SpO2: 97 % O2 Device: None (Room air) Oxygen Delivery: 2 LPM  Weight: 207 lbs 9.6 oz      Patient appears improved from yesterday no longer nauseated  General appearance: Patient is alert and oriented x3, no apparent distress, pleasant and conversing normally, speaking in full sentences, appears stated age  HEENT:   Mucous membranes are moist  RESPIRATORY: Clear to auscultation bilateral, good air movement  CARDIOVASCULAR: Regular rate and rhythm, normal S1/S2, no murmurs  GASTROINTESTINAL: Obese, non-distended, non-tender,  soft, bowel sounds present throughout  NEUROLOGIC:  Cranial nerves II-XII intact, without any focal deficits, strength 5/5 throughout  EXTREMITIES:  Moves all extremities, no clubbing, cyanosis, nor edema, left knee dressing in place      Data     I have personally reviewed the following data over the past 24 hrs:    N/A  \   10.8 (L)   / 261     N/A N/A N/A /  N/A   N/A N/A 0.55 \       Imaging:   Results for orders placed or performed during the hospital encounter of 10/21/24   XR Knee Port Left 1/2 Views    Narrative    LEFT KNEE PORTABLE ONE TO TWO VIEWS   10/21/2024 11:34 AM     HISTORY:  Postop total knee.    COMPARISON: None.      Impression    IMPRESSION: Total knee arthroplasty. The femoral component was only  partially included in the field-of-view of the frontal projection. No  complication or periprosthetic fracture evident.    LUIS LEON MD         SYSTEM ID:  [id number]     Procedures: Left total knee arthroplasty revision 10/21/2024  I have personally have reviewed the patient's most up to date  labs, orders, and medications myself

## 2024-10-22 NOTE — PLAN OF CARE
Goal Outcome Evaluation:      Plan of Care Reviewed With: patient    Overall Patient Progress: improvingOverall Patient Progress: improving    Outcome Evaluation: Pt is SBA with transfers. SL. on RA. scheduled Tylenol given for pain 6/10, pt wants to take Oxycodone before PT. Pt is voiding adequate amts, bladder scan PVR 30 ml. Dressing cdi. Plan to d/c home today.    Patient vital signs are at baseline: Yes  Patient able to ambulate as they were prior to admission or with assist devices provided by therapies during their stay:  Yes  Patient MUST void prior to discharge:  Yes  Patient able to tolerate oral intake:  Yes  Pain has adequate pain control using Oral analgesics:  Yes  Does patient have an identified :  Yes  Has goal D/C date and time been discussed with patient:  Yes      Problem: Adult Inpatient Plan of Care  Goal: Plan of Care Review  Description: The Plan of Care Review/Shift note should be completed every shift.  The Outcome Evaluation is a brief statement about your assessment that the patient is improving, declining, or no change.  This information will be displayed automatically on your shift  note.  10/22/2024 0612 by Virgie Carson RN  Outcome: Progressing  Flowsheets (Taken 10/22/2024 0612)  Outcome Evaluation: Pt is SBA with transfers. SL. on RA. scheduled Tylenol given for pain 6/10, pt wants to take Oxycodone before PT. Pt is voiding adequate amts, bladder scan PVR 30 ml. Dressing cdi. Plan to d/c home today.  Plan of Care Reviewed With: patient  Overall Patient Progress: improving  10/21/2024 2254 by Virgie Carson RN  Outcome: Progressing  Flowsheets (Taken 10/21/2024 2254)  Outcome Evaluation: Pt is alert and oriented x4, assist of 1 with transfers. Pain controlled with prn Oxycodone 5 mg, scheduled tylenol. Pt is on RA. VSS. Walked to bahroom x2, voiding adequate amts. Plan to d/c home tomorrow. Son with transport pt.  Plan of Care Reviewed With: patient  Overall Patient Progress:  "improving  Goal: Patient-Specific Goal (Individualized)  Description: You can add care plan individualizations to a care plan. Examples of Individualization might be:  \"Parent requests to be called daily at 9am for status\", \"I have a hard time hearing out of my right ear\", or \"Do not touch me to wake me up as it startles  me\".  10/22/2024 0612 by Virgie Carson RN  Outcome: Progressing  10/21/2024 2254 by Virgie Carson RN  Outcome: Progressing  Goal: Absence of Hospital-Acquired Illness or Injury  10/22/2024 0612 by Virgie Carson RN  Outcome: Progressing  10/21/2024 2254 by Virgie Carson RN  Outcome: Progressing  Intervention: Identify and Manage Fall Risk  Recent Flowsheet Documentation  Taken 10/22/2024 0216 by Virgie Carson RN  Safety Promotion/Fall Prevention:   activity supervised   assistive device/personal items within reach   nonskid shoes/slippers when out of bed   safety round/check completed  Taken 10/21/2024 2256 by Virgie Carson RN  Safety Promotion/Fall Prevention:   activity supervised   assistive device/personal items within reach   nonskid shoes/slippers when out of bed   safety round/check completed  Intervention: Prevent and Manage VTE (Venous Thromboembolism) Risk  Recent Flowsheet Documentation  Taken 10/22/2024 0216 by Virgie Carson RN  VTE Prevention/Management: SCDs on (sequential compression devices)  Taken 10/21/2024 2256 by Virgie Carson RN  VTE Prevention/Management: SCDs on (sequential compression devices)  Intervention: Prevent Infection  Recent Flowsheet Documentation  Taken 10/22/2024 0216 by Virgie Carson RN  Infection Prevention: rest/sleep promoted  Taken 10/21/2024 2256 by Virgie Carson RN  Infection Prevention: rest/sleep promoted  Goal: Optimal Comfort and Wellbeing  10/22/2024 0612 by Virgie Carson RN  Outcome: Progressing  10/21/2024 2254 by Virgie Carson RN  Outcome: Progressing  Goal: Readiness for Transition of Care  10/22/2024 0612 by Virgie Carson RN  Outcome: " Progressing  10/21/2024 2254 by Virgie Carson RN  Outcome: Progressing     Problem: Knee Arthroplasty  Goal: Optimal Coping  10/22/2024 0612 by Virgie Carson RN  Outcome: Progressing  10/21/2024 2254 by Virgie Carson RN  Outcome: Progressing  Goal: Absence of Bleeding  10/22/2024 0612 by Virgie Carson RN  Outcome: Progressing  10/21/2024 2254 by Virgie Carson RN  Outcome: Progressing  Intervention: Monitor and Manage Bleeding  Recent Flowsheet Documentation  Taken 10/22/2024 0216 by Virgie Carson RN  Bleeding Management: dressing monitored  Taken 10/21/2024 2256 by Virgie Carson RN  Bleeding Management: dressing monitored  Goal: Effective Bowel Elimination  10/22/2024 0612 by Virgie Carson RN  Outcome: Progressing  10/21/2024 2254 by Virgie Carson RN  Outcome: Progressing  Goal: Fluid and Electrolyte Balance  10/22/2024 0612 by Virgie Carson RN  Outcome: Progressing  10/21/2024 2254 by Virgie Carson RN  Outcome: Progressing  Intervention: Monitor and Manage Fluid and Electrolyte Balance  Recent Flowsheet Documentation  Taken 10/22/2024 0216 by Virgie Carson RN  Fluid/Electrolyte Management: fluids provided  Taken 10/21/2024 2256 by Virgie Carson RN  Fluid/Electrolyte Management: fluids provided  Goal: Optimal Functional Ability  10/22/2024 0612 by Virgie Carson RN  Outcome: Progressing  10/21/2024 2254 by Virgie Carson RN  Outcome: Progressing  Intervention: Promote Optimal Functional Status  Recent Flowsheet Documentation  Taken 10/21/2024 2234 by Virgie Carson RN  Activity Management: ambulated to bathroom  Goal: Absence of Infection Signs and Symptoms  10/22/2024 0612 by Virgie Carson RN  Outcome: Progressing  10/21/2024 2254 by Virgie Carson RN  Outcome: Progressing  Goal: Intact Neurovascular Status  10/22/2024 0612 by Virgie Carson RN  Outcome: Progressing  10/21/2024 2254 by Virgie Carson RN  Outcome: Progressing  Goal: Anesthesia/Sedation Recovery  10/22/2024 0612 by Virgie Carson RN  Outcome:  Progressing  10/21/2024 2254 by Virgie Carson RN  Outcome: Progressing  Intervention: Optimize Anesthesia Recovery  Recent Flowsheet Documentation  Taken 10/22/2024 0216 by Virgie Carson RN  Safety Promotion/Fall Prevention:   activity supervised   assistive device/personal items within reach   nonskid shoes/slippers when out of bed   safety round/check completed  Administration (IS): proper technique demonstrated  Patient Tolerance (IS): good  Taken 10/21/2024 2256 by Virgie Carson RN  Safety Promotion/Fall Prevention:   activity supervised   assistive device/personal items within reach   nonskid shoes/slippers when out of bed   safety round/check completed  Administration (IS): proper technique demonstrated  Level Incentive Spirometer (mL): 1500  Incentive Spirometer Predicted Level (mL): 2000  Number of Repetitions (IS): 5  Patient Tolerance (IS): good  Goal: Optimal Pain Control and Function  10/22/2024 0612 by Virgie Carson RN  Outcome: Progressing  10/21/2024 2254 by Virgie Carson RN  Outcome: Progressing  Goal: Nausea and Vomiting Relief  10/22/2024 0612 by Virgie Carson RN  Outcome: Progressing  10/21/2024 2254 by Virgie Carson RN  Outcome: Progressing  Intervention: Prevent or Manage Nausea and Vomiting  Recent Flowsheet Documentation  Taken 10/22/2024 0216 by Virgie Carson RN  Nausea/Vomiting Interventions: antiemetic  Taken 10/21/2024 2256 by Virgie Carson RN  Nausea/Vomiting Interventions: antiemetic  Goal: Effective Urinary Elimination  10/22/2024 0612 by Virgie Carson RN  Outcome: Progressing  10/21/2024 2254 by Virgie Carson RN  Outcome: Progressing  Goal: Effective Oxygenation and Ventilation  10/22/2024 0612 by Virgie Carson RN  Outcome: Progressing  10/21/2024 2254 by Virgie Carson RN  Outcome: Progressing

## 2024-10-22 NOTE — PLAN OF CARE
"Goal Outcome Evaluation:      Plan of Care Reviewed With: patient    Overall Patient Progress: improvingOverall Patient Progress: improving    Outcome Evaluation: Pt is alert and oriented x4, assist of 1 with transfers. Pain controlled with prn Oxycodone 5 mg, scheduled tylenol. Pt is on RA. VSS. Walked to bahroom x2, voiding adequate amts. Plan to d/c home tomorrow. Son with transport pt.    Patient vital signs are at baseline: Yes  Patient able to ambulate as they were prior to admission or with assist devices provided by therapies during their stay:  Yes  Patient MUST void prior to discharge:  Yes  Patient able to tolerate oral intake:  Yes  Pain has adequate pain control using Oral analgesics:  Yes  Does patient have an identified :  Yes  Has goal D/C date and time been discussed with patient:  Yes      Problem: Adult Inpatient Plan of Care  Goal: Plan of Care Review  Description: The Plan of Care Review/Shift note should be completed every shift.  The Outcome Evaluation is a brief statement about your assessment that the patient is improving, declining, or no change.  This information will be displayed automatically on your shift  note.  Outcome: Progressing  Flowsheets (Taken 10/21/2024 4476)  Outcome Evaluation: Pt is alert and oriented x4, assist of 1 with transfers. Pain controlled with prn Oxycodone 5 mg, scheduled tylenol. Pt is on RA. VSS. Walked to bahroom x2, voiding adequate amts. Plan to d/c home tomorrow. Son with transport pt.  Plan of Care Reviewed With: patient  Overall Patient Progress: improving  Goal: Patient-Specific Goal (Individualized)  Description: You can add care plan individualizations to a care plan. Examples of Individualization might be:  \"Parent requests to be called daily at 9am for status\", \"I have a hard time hearing out of my right ear\", or \"Do not touch me to wake me up as it startles  me\".  Outcome: Progressing  Goal: Absence of Hospital-Acquired Illness or " Injury  Outcome: Progressing  Goal: Optimal Comfort and Wellbeing  Outcome: Progressing  Goal: Readiness for Transition of Care  Outcome: Progressing     Problem: Knee Arthroplasty  Goal: Optimal Coping  Outcome: Progressing  Goal: Absence of Bleeding  Outcome: Progressing  Goal: Effective Bowel Elimination  Outcome: Progressing  Goal: Fluid and Electrolyte Balance  Outcome: Progressing  Goal: Optimal Functional Ability  Outcome: Progressing  Intervention: Promote Optimal Functional Status  Recent Flowsheet Documentation  Taken 10/21/2024 2234 by Virgie Carson RN  Activity Management: ambulated to bathroom  Goal: Absence of Infection Signs and Symptoms  Outcome: Progressing  Goal: Intact Neurovascular Status  Outcome: Progressing  Goal: Anesthesia/Sedation Recovery  Outcome: Progressing  Goal: Optimal Pain Control and Function  Outcome: Progressing  Goal: Nausea and Vomiting Relief  Outcome: Progressing  Goal: Effective Urinary Elimination  Outcome: Progressing  Goal: Effective Oxygenation and Ventilation  Outcome: Progressing

## 2024-10-22 NOTE — PLAN OF CARE
Physical Therapy Discharge Summary    Reason for therapy discharge:    All goals and outcomes met, no further needs identified.    Progress towards therapy goal(s). See goals on Care Plan in [facility] electronic health record for goal details.  Goals met    Therapy recommendation(s):    Defer to ortho

## 2024-10-26 LAB
BACTERIA SNV CULT: NO GROWTH
BACTERIA TISS BX CULT: NO GROWTH
BACTERIA TISS BX CULT: NO GROWTH

## 2024-10-28 LAB
BACTERIA TISS BX CULT: NORMAL
BACTERIA TISS BX CULT: NORMAL

## 2024-10-31 NOTE — DISCHARGE SUMMARY
"Discharge Summary    Conor Kapadia MRN# [id number]   YOB: 1950 Age: 74 year old     Date of Admission: 10/21/2024    Date of Discharge: 10/22/2024    Reason for Admission: Conor Kapadia is an 74 year old female who was admitted to the hospital following surgery with Dr. Dex Arroyo.    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 Kapadia was admitted and underwent the above procedure. The patient tolerated the procedure well. There were no complications. Following surgery she was admitted to the floor.  During her stay she did not require any blood transfusions.  Her last hemoglobin was noted to be   Lab Results   Component Value Date    HGB 10.8 10/22/2024   .  Her pain was controlled with oral pain medication.  During her stay she progressed well in physical therapy and all the therapy goals were met.     Discharge Physical Exam:  /53 (BP Location: Right arm, Patient Position: Semi-Dill's, Cuff Size: Adult Regular)   Pulse 81   Temp 97.8  F (36.6  C) (Temporal)   Resp 16   Ht 1.576 m (5' 2.05\")   Wt 94.2 kg (207 lb 9.6 oz)   SpO2 98%   BMI 37.91 kg/m    Neurovascularly intact, distal pulses present bilaterally.  Calves are negative bilaterally, both soft and nontender.  The wound looks good with minimal erythema of the surrounding skin.    Assessment: Ms. Kapadia is stable and doing well status post left revision total knee arthroplasty on POD #1.    Plan: We will discharge her home on analgesics and deep venous thrombosis prophylaxis.  She will follow-up with Nancy Jessica PA-C or Dr. Dex Arroyo approximately 2 weeks from surgery.  She may call [phone] to schedule an appointment.    Discharge Instructions:  Discharge diet: " Regular   Discharge activity: Weight bear as tolerated.  Advance from the walker to a cane as tolerated.  Discontinue the use of cane when comfortable.   Physical therapy: Work on therapy exercises given in the hospital.     Discharge follow-up: Follow up with Nancy Jessica PA-C in 12-16 days.   Anticoagulation Asprin 325 mg twice daily for 5 weeks.   Wound care: Leave aquacel dressing in place until post-op follow-up.  If it becomes loose or soiled then remove and replace with daily dry dressings.  Keep wound clean and dry.  May get incision area wet in shower but do not soak or scrub.         Meds:     Medication List        Started      aspirin 325 MG EC tablet  Commonly known as: ASA  Take one Aspirin tab twice daily for 5 weeks.     hydrOXYzine HCl 25 MG tablet  Commonly known as: ATARAX  25-50 mg, Oral, EVERY 6 HOURS PRN     senna-docusate 8.6-50 MG tablet  Commonly known as: SENOKOT-S/PERICOLACE  1 tablet, Oral, 2 TIMES DAILY            Modified      acetaminophen 325 MG tablet  Commonly known as: Tylenol  650 mg, Oral, EVERY 6 HOURS  What changed:   when to take this  reasons to take this            ASK your doctor about these medications      oxyCODONE 5 MG tablet  Commonly known as: ROXICODONE  5-10 mg, Oral, EVERY 4 HOURS PRN  Ask about: Should I take this medication?                  Nancy Jessica PA-C  O: [phone]

## 2024-11-04 LAB — BACTERIA SNV CULT: NORMAL

## 2025-03-16 ENCOUNTER — HEALTH MAINTENANCE LETTER (OUTPATIENT)
Age: 75
End: 2025-03-16

## (undated) DEVICE — BONE CEMENT MIXEVAC III HI VAC KIT  0206-015-000

## (undated) DEVICE — PACK TOTAL KNEE BOXED LATEX FREE PO15TKFCT

## (undated) DEVICE — CATH TRAY FOLEY SURESTEP 16FR DRAIN BAG STATOCK A899916

## (undated) DEVICE — GLOVE BIOGEL PI ULTRATOUCH SZ 8.0 41180

## (undated) DEVICE — LINEN DRAPE 54X72" 5467

## (undated) DEVICE — BLADE SAW SAGITTAL STRK 18X90X1.27MM HD SYS 6 6118-127-090

## (undated) DEVICE — SET HANDPIECE INTERPULSE W/COAXIAL FAN SPRAY TIP 0210118000

## (undated) DEVICE — GLOVE BIOGEL PI SZ 7.0 40870

## (undated) DEVICE — SUCTION MANIFOLD NEPTUNE 2 SYS 4 PORT 0702-020-000

## (undated) DEVICE — IMPLANTABLE DEVICE: Type: IMPLANTABLE DEVICE | Site: KNEE | Status: NON-FUNCTIONAL

## (undated) DEVICE — Device

## (undated) DEVICE — SU STRATAFIX PDS PLUS 1 CT-1 12" SXPP1A443

## (undated) DEVICE — DRSG AQUACEL AG 3.5X9.75" HYDROFIBER 412011

## (undated) DEVICE — BLADE GIGLI 20" 2808-02

## (undated) DEVICE — LINEN FULL SHEET 5511

## (undated) DEVICE — GLOVE BIOGEL PI MICRO INDICATOR UNDERGLOVE SZ 8.0 48980

## (undated) DEVICE — BAG CLEAR TRASH 1.3M 39X33" P4040C

## (undated) DEVICE — GLOVE BIOGEL PI SZ 7.5 40875

## (undated) DEVICE — BLADE SAW SAGITTAL STRK 25X75X0.89MM SYS 6 6125-089-075

## (undated) DEVICE — TOURNIQUET SGL BLADDER 34"X4" PURPLE 5921-034-135

## (undated) DEVICE — BNDG ELASTIC 6" DBL LENGTH UNSTERILE 6611-16

## (undated) DEVICE — SUTURE MONOCRYL+ 2-0 CT-1 36" UNDYED MCP945H

## (undated) DEVICE — SOL NACL 0.9% IRRIG 3000ML BAG 2B7477

## (undated) DEVICE — LINEN ORTHO ACL PACK 5447

## (undated) DEVICE — SU VICRYL+ 1 MO-4 18" DYED VCP702D

## (undated) DEVICE — DRAPE STERI U 1015

## (undated) DEVICE — BLADE SAW RECIP STRK LONG 70X12.5X0.9MM 0277-096-278

## (undated) DEVICE — CAST PADDING 6" STERILE 9046S

## (undated) DEVICE — GLOVE BIOGEL PI MICRO INDICATOR UNDERGLOVE SZ 7.0 48970

## (undated) DEVICE — DRSG AQUACEL AG 3.5X13.75" HYDROFIBER 412012

## (undated) DEVICE — SU STRATAFIX MONOCRYL 3-0 SPIRAL PS-1 45CM SXMP1B102

## (undated) DEVICE — HOOD FLYTE W/PEELAWAY 408-800-100

## (undated) DEVICE — HOOD SURG T7PLUS PEEL AWAY FACE SHIELD STRL LF 0416-801-100

## (undated) DEVICE — CAST PADDING 6IN COTTON WEBRIL STERILE 2554

## (undated) DEVICE — PREP CHLORAPREP 26ML TINTED HI-LITE ORANGE 930815

## (undated) RX ORDER — LIDOCAINE HYDROCHLORIDE 10 MG/ML
INJECTION, SOLUTION EPIDURAL; INFILTRATION; INTRACAUDAL; PERINEURAL
Status: DISPENSED
Start: 2024-10-21

## (undated) RX ORDER — CEFAZOLIN SODIUM/WATER 2 G/20 ML
SYRINGE (ML) INTRAVENOUS
Status: DISPENSED
Start: 2024-10-21

## (undated) RX ORDER — PROPOFOL 10 MG/ML
INJECTION, EMULSION INTRAVENOUS
Status: DISPENSED
Start: 2024-10-21

## (undated) RX ORDER — DEXAMETHASONE SODIUM PHOSPHATE 4 MG/ML
INJECTION, SOLUTION INTRA-ARTICULAR; INTRALESIONAL; INTRAMUSCULAR; INTRAVENOUS; SOFT TISSUE
Status: DISPENSED
Start: 2024-02-26

## (undated) RX ORDER — DEXAMETHASONE SODIUM PHOSPHATE 4 MG/ML
INJECTION, SOLUTION INTRA-ARTICULAR; INTRALESIONAL; INTRAMUSCULAR; INTRAVENOUS; SOFT TISSUE
Status: DISPENSED
Start: 2024-10-21

## (undated) RX ORDER — HYDROMORPHONE HCL IN WATER/PF 6 MG/30 ML
PATIENT CONTROLLED ANALGESIA SYRINGE INTRAVENOUS
Status: DISPENSED
Start: 2024-10-21

## (undated) RX ORDER — OXYCODONE HYDROCHLORIDE 5 MG/1
TABLET ORAL
Status: DISPENSED
Start: 2024-10-21

## (undated) RX ORDER — TRANEXAMIC ACID 650 MG/1
TABLET ORAL
Status: DISPENSED
Start: 2024-02-26

## (undated) RX ORDER — VANCOMYCIN HYDROCHLORIDE 1 G/20ML
INJECTION, POWDER, LYOPHILIZED, FOR SOLUTION INTRAVENOUS
Status: DISPENSED
Start: 2024-02-26

## (undated) RX ORDER — ACETAMINOPHEN 325 MG/1
TABLET ORAL
Status: DISPENSED
Start: 2024-02-26

## (undated) RX ORDER — ACETAMINOPHEN 325 MG/1
TABLET ORAL
Status: DISPENSED
Start: 2024-10-21

## (undated) RX ORDER — ONDANSETRON 2 MG/ML
INJECTION INTRAMUSCULAR; INTRAVENOUS
Status: DISPENSED
Start: 2024-10-21

## (undated) RX ORDER — LIDOCAINE HYDROCHLORIDE 10 MG/ML
INJECTION, SOLUTION EPIDURAL; INFILTRATION; INTRACAUDAL; PERINEURAL
Status: DISPENSED
Start: 2024-02-26

## (undated) RX ORDER — VANCOMYCIN HYDROCHLORIDE 1 G/20ML
INJECTION, POWDER, LYOPHILIZED, FOR SOLUTION INTRAVENOUS
Status: DISPENSED
Start: 2024-10-21

## (undated) RX ORDER — PROPOFOL 10 MG/ML
INJECTION, EMULSION INTRAVENOUS
Status: DISPENSED
Start: 2024-02-26

## (undated) RX ORDER — FENTANYL CITRATE-0.9 % NACL/PF 10 MCG/ML
PLASTIC BAG, INJECTION (ML) INTRAVENOUS
Status: DISPENSED
Start: 2024-02-26

## (undated) RX ORDER — SCOLOPAMINE TRANSDERMAL SYSTEM 1 MG/1
PATCH, EXTENDED RELEASE TRANSDERMAL
Status: DISPENSED
Start: 2024-10-21

## (undated) RX ORDER — FENTANYL CITRATE 50 UG/ML
INJECTION, SOLUTION INTRAMUSCULAR; INTRAVENOUS
Status: DISPENSED
Start: 2024-10-21

## (undated) RX ORDER — GLYCOPYRROLATE 0.2 MG/ML
INJECTION, SOLUTION INTRAMUSCULAR; INTRAVENOUS
Status: DISPENSED
Start: 2024-02-26

## (undated) RX ORDER — CELECOXIB 200 MG/1
CAPSULE ORAL
Status: DISPENSED
Start: 2024-02-26

## (undated) RX ORDER — FENTANYL CITRATE 50 UG/ML
INJECTION, SOLUTION INTRAMUSCULAR; INTRAVENOUS
Status: DISPENSED
Start: 2024-02-26

## (undated) RX ORDER — HYDROXYZINE HYDROCHLORIDE 10 MG/1
TABLET, FILM COATED ORAL
Status: DISPENSED
Start: 2024-10-21

## (undated) RX ORDER — TRANEXAMIC ACID 650 MG/1
TABLET ORAL
Status: DISPENSED
Start: 2024-10-21

## (undated) RX ORDER — CEFAZOLIN SODIUM/WATER 2 G/20 ML
SYRINGE (ML) INTRAVENOUS
Status: DISPENSED
Start: 2024-02-26